# Patient Record
Sex: FEMALE | Race: ASIAN | NOT HISPANIC OR LATINO | Employment: FULL TIME | URBAN - METROPOLITAN AREA
[De-identification: names, ages, dates, MRNs, and addresses within clinical notes are randomized per-mention and may not be internally consistent; named-entity substitution may affect disease eponyms.]

---

## 2016-07-22 NOTE — PROGRESS NOTES
Assessment   1  Vaginal pain (625 9) (R10 2)   2  Vaginal discharge (623 5) (N89 8)   3  Fever (780 60) (R50 9)   4  Herpes simplex infection (054 9) (B00 9)   5  Unprotected sex (V69 2) (Z72 51)    Plan   Fever, Herpes simplex infection, SocHx: Unprotected sex, Vaginal discharge, Vaginal  pain    · (1) CHLAMYDIA/GC AMPLIFIED DNA, PCR; Source:Urine, Unspecified Source; Status: In  Progress - Specimen/Data Collected;   Done: 27FQQ5320   · (1) HERPES SIMPLEX VIRUS CULTURE; Status: In Progress - Specimen/Data Collected;    Done: 90GKT6332  Herpes simplex infection    · ValACYclovir HCl - 1 GM Oral Tablet; take 1 tablet PO Q12 hours x 10 days  Herpes simplex infection, Vaginal discharge, Vaginal pain    · TraMADol HCl - 50 MG Oral Tablet; TAKE 1 TABLET TWICE DAILY AS NEEDED    (1) VAGINITIS/ VAGINOSIS, DNA ( AFFIRM); Status:Resulted - Requires Verification;   Done: 05CVE6890 12:00AM  Due:18Dkj4580;Ordered; For:Fever, Herpes simplex infection, SocHx: Unprotected sex, Vaginal discharge, Vaginal pain; Ordered By:Vanda Pond; Discussion/Summary    Patient is a 53-year-old female who presents today for vaginal discharge, vaginal pain for the past 3-4 days as well as fever and nausea  She had called while I was on-call last night around 7 PM stating that she had vaginal symptoms and called her GYN yesterday who prescribed her Diflucan  She states that that didn't help and she was on her way to an urgent care for further evaluation because her symptoms were unbearable  Patient told her that she should follow-up if she has any persistent symptoms  Today patient shows up stating that they put her on doxycycline by mouth, but gave her IV antibiotics for possible PID at the urgent care, as well as IV fluids for dehydration  Unfortunately we have no records from her urgent care visit as of yet and patient is unsure what else they tested for her or what else they did   She states she took her first dose of doxycycline last night and then tried to take it this morning on an empty stomach and vomited  She states they also gave her another medication and when she showed me the bottle it was metronidazole which I'm assuming to cover for bacterial vaginosis or Trichomonas  She stated they did not perform a vaginal exam her genital exam  She has a fairly significant abnormal genital exam with extensive sticky discharge from her vagina as well as few external ulcers and few ulcers internally as well that are very painful  I did perform a vaginal swab to rule out BV and Trichomonas  I also obtained a swab of the genital lesions to rule out herpes  She does have oral herpes on exam so I did put her on a course of Valtrex Ã10 days enough to cover for both infections  We also collected a urine specimen on her to send out for chlamydia and gonorrhea, which is of most high suspicion at this time  I told her that we need to have her take the doxycycline with food, as this is the medication was going to kill any chlamydia or gonorrhea STDs that she may have  I told patient if she continues to vomit up the medication I may need to consider switching and she needs to call immediately if this continues  Less likely is vaginosis her vaginitis, so I did tell her not to start the metronidazole as of yet until the testing comes back  I did prescribe her tramadol was given a written prescription from the office today to help with her discomfort  She should continue Motrin or Tylenol for fever control  I also told her that she should consider making a follow-up appointment in one week with her gynecologist if the symptoms continue and we cannot find answers to the cause of her symptoms from the testing  I told her we can call sooner to get her in if necessary  Work note given for today, tomorrow, Wednesday  She should call sooner with any high fevers or worse symptoms  We will try to obtain the urgent care record for our records     Possible side effects of new medications were reviewed with the patient/guardian today  The treatment plan was reviewed with the patient/guardian  The patient/guardian understands and agrees with the treatment plan      Chief Complaint  PT complains of burning and vaginal discharge since Saturday  states she had some vaginal bleeding also      History of Present Illness  HPI: Patient is a 25y/o female here for close f/u to vaginal sxs  She was evaluated by [facility] for sxs  States had blood drawn  Was given IV abx and fluids for high fever, possible PID and dehydration  Started on doxycycline for possible infection  No OV note on file  She was given diflucan PO by her GYN the day prior because she called GYN on call and that is what they prescribed for her vaginal sxs  Pt currently admits to nausea, pain of external genitals and vagina, and lips  Also throat is sore  She has a low grade temp  Both urinary frequency and urgency  Burning of genitals when urine touches it  Currently took doxycycline  She vomited x 1 this AM after taking 2nd dose  Taking motrin  Admits to vaginal discharge, runny, white, orange  She admits to having unprotected sex within the past week with a new partner  Sxs she is experiencing sxs for past 4 days  Review of Systems    Constitutional: as noted in HPI  Cardiovascular: no complaints of slow or fast heart rate, no chest pain, no palpitations, no leg claudication or lower extremity edema  Respiratory: no complaints of shortness of breath, no wheezing, no dyspnea on exertion, no orthopnea or PND  Gastrointestinal: as noted in HPI  Genitourinary: as noted in HPI  Active Problems   1  Acute bronchitis with bronchospasm (466 0) (J20 9)   2  Acute upper respiratory infection (465 9) (J06 9)   3  Depression with anxiety (300 4) (F41 8)   4  Low back pain (724 2) (M54 5)   5  Lyme disease (088 81) (A69 20)   6  Multiple joint pain (719 49) (M25 50)   7   Open wound of finger, initial encounter (883  0) (S61 209A)   8  Sciatica of left side (724 3) (M54 32)    Past Medical History   1  History of Acute sinusitis (461 9) (J01 90)   2  History of Atypical chest pain (786 59) (R07 89)   3  History of Contact dermatitis (692 9) (L25 9)   4  History of Dysuria (788 1) (R30 0)   5  History of Fatigue (780 79) (R53 83)   6  History of heartburn (V12 79) (Z87 898)   7  History of Microscopic hematuria (599 72) (R31 2)   8  History of Need for influenza vaccination (V04 81) (Z23)   9  History of Palpitations (785 1) (R00 2)   10  History of Urinary tract infection (599 0) (N39 0)    Family History  Mother    1  Family history of Arthritis   2  Family history of Hypertension    Social History   · Former smoker (X90 14) (U17 509)   · Unprotected sex (V69 2) (Z72 51)   · Uses birth control (V25 9) (Z30 9)  The social history was reviewed and updated today  Current Meds   1  Cephalexin 500 MG Oral Capsule; TAKE 1 CAPSULE Every twelve hours; Therapy: 69LMQ4028 to (Evaluate:15Mar2016)  Requested for: 84EKA0229; Last   Rx:10Mar2016 Ordered   2  ClonazePAM 0 5 MG Oral Tablet; TAKE 1 TABLET Bedtime PRN; Therapy: 91MNG3248 to (Evaluate:12Htg7332)  Requested for: 16LMF2241; Last   Rx:18Jun2015 Ordered   3  Depo-Provera 150 MG/ML Intramuscular Suspension; inject intramuscularly every 12   weeks as directed; Therapy: 10CBE7992 to Recorded   4  Doxycycline Hyclate 100 MG Oral Capsule; Take 1 capsule twice daily; Therapy: 76IGJ5678 to (Evaluate:66Vqb6706) Recorded   5  Motrin  MG Oral Tablet; Therapy: 11QSY1065 to Recorded    The medication list was reviewed and updated today  Allergies   1   BL Petroleum Jelly OINT    Vitals   Recorded: 01AHC3865 38:30WG   Systolic 101   Diastolic 60   Heart Rate 79   Respiration 17   Temperature 100 4 F, Tympanic   Pain Scale 8   O2 Saturation 98   Height 5 ft 3 in   Weight 121 lb 7 04 oz   BMI Calculated 21 51   BSA Calculated 1 57     Physical Exam    Constitutional General appearance: No acute distress, well appearing and well nourished  appears healthy, comfortable, within normal limits of ideal weight and appearance reflects stated age  Vitals reviewed  Pulmonary   Respiratory effort: No increased work of breathing or signs of respiratory distress  Cardiovascular   Auscultation of heart: Normal rate and rhythm, normal S1 and S2, no murmurs  Abdomen   Abdomen: Non-tender, no masses  Genitourinary   External genitalia and vagina: Abnormal   4 small ulcers/bumps appearing on external genitals, 2 distal right labia majora, 2 st tip where labia meet before perinium  There is clear, shiny but extremely sticky discharge noted at vaginal opening and on extrenla genitals  Upon entry into vaginal opening, revealing few small white ulceritic lesions, very tender to touch  appears very red and swollen at opening  Unfortunately, I am unable to advance speculum any more secondary to pain  Skin   Skin and subcutaneous tissue: Abnormal   multiple erythematous clusters of vesicles on lips, consistent with herpes simplex infection  Psychiatric   Judgment and insight: Normal     Orientation to person, place, and time: Normal     Mood and affect: Abnormal   Mood and Affect: concerned and tearful        Signatures   Electronically signed by : Brooklynn Delatorre, [facility]; Jul 18 2016 12:29PM EST                       (Author)    Electronically signed by : Lin Woo DO; Jul 21 2016  5:26AM EST                       (Co-author)

## 2017-04-12 ENCOUNTER — ALLSCRIPTS OFFICE VISIT (OUTPATIENT)
Dept: OTHER | Facility: OTHER | Age: 27
End: 2017-04-12

## 2017-08-23 ENCOUNTER — ALLSCRIPTS OFFICE VISIT (OUTPATIENT)
Dept: OTHER | Facility: OTHER | Age: 27
End: 2017-08-23

## 2017-12-23 ENCOUNTER — APPOINTMENT (OUTPATIENT)
Dept: LAB | Facility: HOSPITAL | Age: 27
End: 2017-12-23
Payer: COMMERCIAL

## 2017-12-23 ENCOUNTER — OFFICE VISIT (OUTPATIENT)
Dept: URGENT CARE | Facility: MEDICAL CENTER | Age: 27
End: 2017-12-23
Payer: COMMERCIAL

## 2017-12-23 DIAGNOSIS — J02.9 ACUTE PHARYNGITIS: ICD-10-CM

## 2017-12-23 LAB — S PYO AG THROAT QL: NEGATIVE

## 2017-12-23 PROCEDURE — 87070 CULTURE OTHR SPECIMN AEROBIC: CPT

## 2017-12-23 PROCEDURE — 87430 STREP A AG IA: CPT

## 2017-12-23 PROCEDURE — 99203 OFFICE O/P NEW LOW 30 MIN: CPT

## 2017-12-24 NOTE — PROGRESS NOTES
Assessment   1  History of gastroenteritis (V12 79) (Z87 19)1   2  Gastroenteritis (558 9) (K52 9)1      1 Amended By: Elvira Teresa; Dec 28 2017 9:08 PM EST      Plan   Gastroenteritis    · Start: Ondansetron 4 MG Oral Tablet Disintegrating (Zofran ODT); 1 tablet by mouth every    8 hours when necessary nausea    Discussion/Summary   Discussion Summary:    Rapid strep test-negative  I prescribed Zofran 4 mg ODT q 8 hours for nausea and dizziness  Advised patient to maintain a clear liquid diet as tolerated and advance as tolerated  Continue with ibuprofen for body aches  Suggested over-the-counter Chloraseptic or Cepacol lozenges  Medication Side Effects Reviewed: Possible side effects of new medications were reviewed with the patient/guardian today  Understands and agrees with treatment plan: The treatment plan was reviewed with the patient/guardian  The patient/guardian understands and agrees with the treatment plan    Counseling Documentation With Imm: The patient was counseled regarding diagnostic results  Chief Complaint   1  Sore Throat  Chief Complaint Free Text Note Form: Pt states has dizziness since yesterday, vomited once this am  State son was diagnosed with strep 3 days ago and has sore throat  States  I think I may be getting strep  Took Ibuprofen this am       History of Present Illness   HPI: Patient with 1 day history of dizziness  She still complaining of dizziness  Symptoms also accompanied by nausea, vomiting and diarrhea which began today  She is also complaining of sore throat since yesterday  Also complaining of accompanying body aches  Describes having some slight tinge of blood in diarrhea this morning  Expresses concern because son was recently diagnosed with strep  Hospital Based Practices Required Assessment:      Pain Assessment      the patient states they have pain  The pain is located in the throat   The patient describes the pain as aching  (on a scale of 0 to 10, the patient rates the pain at 5 )       Prefered Language is  english  Primary Language is  english  Review of Systems   Focused-Female:      Constitutional: No fever, no chills, feels well, no tiredness, no recent weight gain or loss  ENT: sore throat  Cardiovascular: no complaints of slow or fast heart rate, no chest pain, no palpitations, no leg claudication or lower extremity edema  Respiratory: no complaints of shortness of breath, no wheezing, no dyspnea on exertion, no orthopnea or PND  Gastrointestinal: nausea,-- vomiting-- and-- diarrhea  Active Problems   1  Alcohol dependence, episodic drinking behavior (303 92) (F10 20)  2  Depression with anxiety (300 4) (F41 8)  3  Flu vaccine need (V04 81) (Z23)  4  Skin tear of female genital tract, initial encounter (878 8) (S31 512A)    Past Medical History   1  History of Acute sinusitis (461 9) (J01 90)  2  History of Acute upper respiratory infection (465 9) (J06 9)  3  History of Atypical chest pain (786 59) (R07 89)  4  History of Contact dermatitis (692 9) (L25 9)  5  History of Dysuria (788 1) (R30 0)  6  History of Fatigue (780 79) (R53 83)  7  History of acute bronchitis with bronchospasm (V12 69) (Z87 09)  8  History of fever (V13 89) (Z87 898)  9  History of heartburn (V12 79) (Z87 898)  10  History of herpes simplex infection (V12 09) (Z86 19)  11  History of vaginal discharge (V13 29) (Z87 42)  12  History of Microscopic hematuria (599 72) (R31 29)  13  History of Need for influenza vaccination (V04 81) (Z23)  14  History of Open wound of finger, initial encounter (883 0) (S61 209A)  15  History of Palpitations (785 1) (R00 2)  16  History of Rash of genital area (782 1) (R21)  17  History of Sciatica of left side (724 3) (M54 32)  18  History of Urinary tract infection (599 0) (N39 0)  19  History of Vaginal pain (625 9) (R10 2)  Active Problems And Past Medical History Reviewed:     The active problems and past medical history were reviewed and updated today  Family History   Mother   1  Family history of Arthritis  2  Family history of Hypertension    Social History    · Former smoker (Q67 69) (Q19 369)   · Uses birth control (V25 9) (Z30 9)    Current Meds   1  ClonazePAM 0 5 MG Oral Tablet; TAKE 1 TABLET AT BEDTIME AS NEEDED FOR SLEEP; Therapy: 73KSY0825 to (Evaluate:35Aiq4279)  Requested for: 12Apr2017; Last     Rx:12Apr2017 Ordered  2  Depo-Provera 150 MG/ML Intramuscular Suspension; inject intramuscularly every 12     weeks as directed; Therapy: 88NVD3599 to Recorded  3  Motrin  MG Oral Tablet; Therapy: 38WNW2097 to Recorded  4  Nystatin-Triamcinolone 987250-3 1 UNIT/GM-% External Ointment; APPLY SPARINGLY     TO AFFECTED AREA(S) TWICE DAILY; Therapy: 25Jft1630 to ( [place])  Requested for: 53Isz9406 Ordered  Medication List Reviewed: The medication list was reviewed and updated today  Allergies   1  BL Petroleum Jelly OINT    Vitals   Signs   Recorded: 86Vga1886 08:17AM   Temperature: 98 3 F  Heart Rate: 70  Respiration: 20  Systolic: 299  Diastolic: 70  Height: 5 ft 2 in  Weight: 136 lb   BMI Calculated: 24 88  BSA Calculated: 1 62  O2 Saturation: 100  Pain Scale: 5    Physical Exam        Constitutional      General appearance: No acute distress, well appearing and well nourished  Ears, Nose, Mouth, and Throat      External inspection of ears and nose: Normal        Otoscopic examination: Tympanic membranes translucent with normal light reflex  Canals patent without erythema  Nasal mucosa, septum, and turbinates: Normal without edema or erythema  Oropharynx: Normal with no erythema, edema, exudate or lesions  Pulmonary      Respiratory effort: No increased work of breathing or signs of respiratory distress  Auscultation of lungs: Clear to auscultation         Cardiovascular      Auscultation of heart: Normal rate and rhythm, normal S1 and S2, without murmurs  Abdomen      Abdomen: Non-tender, no masses  Liver and spleen: No hepatomegaly or splenomegaly         Results/Data   Lab Studies Reviewed: Rapid strep test-negative      Signatures    Electronically signed by : CHLOÉ De Leon ; Dec 23 2017  8:36AM EST                       (Author)     Electronically signed by : CHLOÉ De Leon ; Dec 28 2017  9:09PM EST                       (Author)

## 2017-12-25 LAB — BACTERIA THROAT CULT: NORMAL

## 2018-01-10 NOTE — RESULT NOTES
Verified Results  (1) HERPES SIMPLEX VIRUS CULTURE 67DBB3628 12:29PM Renata Eldridge    Order Number: [id number]     Test Name Result Flag Reference   HERPES SIMPLEX VIRUS CULTURE  A Negative-No Herpes Simplex Virus isolated , Toxic, Contaminated   Positive-Herpes Simplex Virus isolated

## 2018-01-13 VITALS
DIASTOLIC BLOOD PRESSURE: 84 MMHG | HEART RATE: 65 BPM | TEMPERATURE: 99.4 F | RESPIRATION RATE: 16 BRPM | OXYGEN SATURATION: 98 % | SYSTOLIC BLOOD PRESSURE: 122 MMHG | HEIGHT: 62 IN | BODY MASS INDEX: 24.15 KG/M2 | WEIGHT: 131.25 LBS

## 2018-01-14 VITALS
BODY MASS INDEX: 23.29 KG/M2 | WEIGHT: 126.56 LBS | SYSTOLIC BLOOD PRESSURE: 124 MMHG | TEMPERATURE: 99.2 F | DIASTOLIC BLOOD PRESSURE: 80 MMHG | RESPIRATION RATE: 16 BRPM | OXYGEN SATURATION: 99 % | HEIGHT: 62 IN | HEART RATE: 93 BPM

## 2018-01-15 NOTE — MISCELLANEOUS
Message  Return to work or school:   Charly Zavaleta is under my professional care  She was seen in my office on 7/18/16   She is able to return to work on  07/21/2016      OUt of work 07/18/16 - 07/20/2016  Pauline Rodas PA-C        Signatures   Electronically signed by : Claritza Bravo; Jul 18 2016 12:15PM EST                       (Author)    Electronically signed by : Pauline Rodas, [facility]; Jul 20 2016 11:29AM EST                       (Author)

## 2018-01-15 NOTE — MISCELLANEOUS
Message  Return to work or school:   Severiano Do is under my professional care  She was seen in my office on 7/18/16   She is able to return to work on  7/21/16       Robe Morrison- PAC        Signatures   Electronically signed by : Leda James MA; Jul 18 2016  1:28PM EST                       (Author)

## 2018-01-16 NOTE — RESULT NOTES
Verified Results  (1) VAGINITIS/ VAGINOSIS, DNA ( AFFIRM) 09RYN3018 12:30PM Cecile Dow Order Number: [id number]     Test Name Result Flag Reference   CANDIDA SPECIES Negative  Negative   GARDNERELLA VAGINALIS Negative  Negative   TRICHOMONAS VAGINALIS Negative  Negative   Performed at:  [employer] [address]  [id number]  : Adonay Mayberry MD, Phone:  [phone]

## 2018-01-17 NOTE — MISCELLANEOUS
Message  Message Free Text Note Form: Pt called while I was on call last night around 7PM  STates called her GYN yesterday for possible yeast infection  Took diflucan, no better  No has cold sores and also some vaginal sxs  When I called her back about 15 min after she called, she states she already on her way to urgent care because sxs are "unbearable "      Discussion/Summary  Discussion Summary:   Pt going to urgent care for further eval because sxs are "unbearable " She states she will make appt at our office if sxs continue and needs further evaluation and tx        Signatures   Electronically signed by : Catrachito Paz, [facility]; Jul 18 2016 10:54AM EST                       (Author)

## 2018-01-19 ENCOUNTER — OFFICE VISIT (OUTPATIENT)
Dept: URGENT CARE | Facility: CLINIC | Age: 28
End: 2018-01-19
Payer: COMMERCIAL

## 2018-01-19 PROCEDURE — 99213 OFFICE O/P EST LOW 20 MIN: CPT

## 2018-01-23 VITALS
WEIGHT: 136 LBS | RESPIRATION RATE: 20 BRPM | TEMPERATURE: 98.3 F | HEIGHT: 62 IN | SYSTOLIC BLOOD PRESSURE: 116 MMHG | HEART RATE: 70 BPM | DIASTOLIC BLOOD PRESSURE: 70 MMHG | OXYGEN SATURATION: 100 % | BODY MASS INDEX: 25.03 KG/M2

## 2018-01-24 VITALS
HEART RATE: 62 BPM | HEIGHT: 62 IN | TEMPERATURE: 98.7 F | SYSTOLIC BLOOD PRESSURE: 145 MMHG | OXYGEN SATURATION: 97 % | BODY MASS INDEX: 25.21 KG/M2 | DIASTOLIC BLOOD PRESSURE: 87 MMHG | WEIGHT: 137 LBS | RESPIRATION RATE: 18 BRPM

## 2018-01-24 NOTE — PROGRESS NOTES
Assessment    1  Acute sinus infection (461 9) (J01 90)    Plan  Acute sinus infection    · Azithromycin 250 MG Oral Tablet; TAKE 2 TABLETS ON DAY 1 THEN TAKE 1  TABLET A DAY FOR 4 DAYS    Discussion/Summary  Discussion Summary:   Using flonase   use zyrtec or allegra   mucinex plain   increase fluids   anitbiotic as directed  Medication Side Effects Reviewed: Possible side effects of new medications were reviewed with the patient/guardian today  Understands and agrees with treatment plan: The treatment plan was reviewed with the patient/guardian  The patient/guardian understands and agrees with the treatment plan   Counseling Documentation With Imm: The patient was counseled regarding instructions for management, patient and family education, importance of compliance with treatment  Follow Up Instructions: Follow Up with your Primary Care Provider in 1-2 days  If your symptoms worsen, go to the nearest [place] [address] Emergency Department  Chief Complaint    1  Cough  Chief Complaint Free Text Note Form: pt reports cough since xmas, dx with flu on 12/27, cough never cleared up, congestion, sinus pain and HA; mucinex and tylenol cold/flu      History of Present Illness  HPI: 23 yo female, cough for the past few weeks, sinus pressure and congestion, post nasal drip, and hasn't had a flu shot this season  Hospital Based Practices Required Assessment:   Abuse And Domestic Violence Screen    Yes, the patient is safe at home  The patient states no one is hurting them  Depression And Suicide Screen  No, the patient has not had thoughts of hurting themself  No, the patient has not felt depressed in the past 7 days  Prefered Language is  english  Primary Language is  english  Review of Systems  Focused-Female:   Constitutional: as noted in HPI    ENT: as noted in HPI     Cardiovascular: no complaints of slow or fast heart rate, no chest pain, no palpitations, no leg claudication or lower extremity edema  Respiratory: as noted in HPI  ROS Reviewed:   ROS reviewed  Active Problems    1  Alcohol dependence, episodic drinking behavior (303 92) (F10 20)   2  Depression with anxiety (300 4) (F41 8)   3  Flu vaccine need (V04 81) (Z23)   4  Gastroenteritis (558 9) (K52 9)   5  Skin tear of female genital tract, initial encounter (878 8) (S31 512A)   6  Sore throat (462) (J02 9)    Past Medical History    1  History of Acute sinusitis (461 9) (J01 90)   2  History of Acute upper respiratory infection (465 9) (J06 9)   3  History of Atypical chest pain (786 59) (R07 89)   4  History of Contact dermatitis (692 9) (L25 9)   5  History of Dysuria (788 1) (R30 0)   6  History of Fatigue (780 79) (R53 83)   7  History of acute bronchitis with bronchospasm (V12 69) (Z87 09)   8  History of fever (V13 89) (Z87 898)   9  History of gastroenteritis (V12 79) (Z87 19)   10  History of heartburn (V12 79) (Z87 898)   11  History of herpes simplex infection (V12 09) (Z86 19)   12  History of vaginal discharge (V13 29) (Z87 42)   13  History of Microscopic hematuria (599 72) (R31 29)   14  History of Need for influenza vaccination (V04 81) (Z23)   15  History of Open wound of finger, initial encounter (883 0) (S61 209A)   16  History of Palpitations (785 1) (R00 2)   17  History of Rash of genital area (782 1) (R21)   18  History of Sciatica of left side (724 3) (M54 32)   19  History of Urinary tract infection (599 0) (N39 0)   20  History of Vaginal pain (625 9) (R10 2)  Active Problems And Past Medical History Reviewed: The active problems and past medical history were reviewed and updated today  Family History  Mother    1  Family history of Arthritis   2  Family history of Hypertension  Family History Reviewed: The family history was reviewed and updated today  Social History    · Former smoker (K43 67) (R93 863)   · Uses birth control (V25 9) (Z30 9)  Social History Reviewed:  The social history was reviewed and updated today  The social history was reviewed and is unchanged  Current Meds   1  ClonazePAM 0 5 MG Oral Tablet; TAKE 1 TABLET AT BEDTIME AS NEEDED FOR SLEEP; Therapy: 86UMR4810 to (Evaluate:32Jkl6268)  Requested for: 12Apr2017; Last   Rx:12Apr2017 Ordered   2  Depo-Provera 150 MG/ML Intramuscular Suspension; inject intramuscularly every 12   weeks as directed; Therapy: 09GNQ5829 to Recorded   3  Motrin  MG Oral Tablet; Therapy: 18PAR9301 to Recorded   4  Nystatin-Triamcinolone 878387-9 1 UNIT/GM-% External Ointment; APPLY SPARINGLY   TO AFFECTED AREA(S) TWICE DAILY; Therapy: 53Juy1172 to (Last Adán Martinez)  Requested for: 40Uyt4437 Ordered   5  Ondansetron 4 MG Oral Tablet Disintegrating; 1 tablet by mouth every 8 hours when   necessary nausea; Therapy: 88Wpv6275 to (Evaluate:58Vdb4600)  Requested for: 81Zkl2330; Last   Rx:10Lmr6936 Ordered  Medication List Reviewed: The medication list was reviewed and updated today  Allergies    1  BL Petroleum Jelly OINT    Vitals  Signs   Recorded: 52BIU5873 07:11PM   Temperature: 98 7 F  Heart Rate: 62  Respiration: 18  Systolic: 031  Diastolic: 87  Height: 5 ft 2 in  Weight: 137 lb   BMI Calculated: 25 06  BSA Calculated: 1 63  O2 Saturation: 97    Physical Exam    Constitutional   General appearance: No acute distress, well appearing and well nourished  Ears, Nose, Mouth, and Throat   External inspection of ears and nose: Normal     Otoscopic examination: Tympanic membranes translucent with normal light reflex  Canals patent without erythema  Nasal mucosa, septum, and turbinates: Abnormal   thick green mucus in both nares, red and inflammed turbinates noted  Oropharynx: Normal with no erythema, edema, exudate or lesions  Pulmonary   Respiratory effort: No increased work of breathing or signs of respiratory distress  intermittent dry cough noted  Auscultation of lungs: Clear to auscultation      Cardiovascular Auscultation of heart: Normal rate and rhythm, normal S1 and S2, without murmurs  Psychiatric   Orientation to person, place, and time: Normal     Mood and affect: Normal        Signatures   Electronically signed by :  VIOLETA Moses; Jan 19 2018  7:47PM EST                       (Author)

## 2018-07-26 ENCOUNTER — OFFICE VISIT (OUTPATIENT)
Dept: FAMILY MEDICINE CLINIC | Facility: CLINIC | Age: 28
End: 2018-07-26
Payer: COMMERCIAL

## 2018-07-26 VITALS
SYSTOLIC BLOOD PRESSURE: 122 MMHG | OXYGEN SATURATION: 97 % | HEART RATE: 74 BPM | TEMPERATURE: 99.5 F | RESPIRATION RATE: 15 BRPM | HEIGHT: 62 IN | WEIGHT: 124 LBS | DIASTOLIC BLOOD PRESSURE: 74 MMHG | BODY MASS INDEX: 22.82 KG/M2

## 2018-07-26 DIAGNOSIS — Z11.1 SCREENING-PULMONARY TB: ICD-10-CM

## 2018-07-26 DIAGNOSIS — Z00.00 ENCOUNTER FOR WELLNESS EXAMINATION IN ADULT: Primary | ICD-10-CM

## 2018-07-26 PROBLEM — Z30.42 ON DEPO-PROVERA FOR CONTRACEPTION: Status: ACTIVE | Noted: 2017-10-06

## 2018-07-26 PROBLEM — J45.909 ASTHMA: Status: ACTIVE | Noted: 2018-06-01

## 2018-07-26 PROBLEM — F32.A MOOD DISORDER OF DEPRESSED TYPE: Status: ACTIVE | Noted: 2018-06-01

## 2018-07-26 PROBLEM — F10.20 ALCOHOL DEPENDENCE, EPISODIC DRINKING BEHAVIOR (HCC): Status: ACTIVE | Noted: 2017-04-12

## 2018-07-26 PROCEDURE — 99395 PREV VISIT EST AGE 18-39: CPT | Performed by: PHYSICIAN ASSISTANT

## 2018-07-26 PROCEDURE — 86580 TB INTRADERMAL TEST: CPT | Performed by: PHYSICIAN ASSISTANT

## 2018-07-26 RX ORDER — MEDROXYPROGESTERONE ACETATE 150 MG/ML
150 INJECTION, SUSPENSION INTRAMUSCULAR
COMMUNITY
Start: 2014-11-03

## 2018-07-26 RX ORDER — HYDROXYZINE HYDROCHLORIDE 25 MG/1
1-2 TABLET, FILM COATED ORAL EVERY 6 HOURS PRN
COMMUNITY
Start: 2018-06-11 | End: 2018-11-01

## 2018-07-26 RX ORDER — NYSTATIN AND TRIAMCINOLONE ACETONIDE 100000; 1 [USP'U]/G; MG/G
1 OINTMENT TOPICAL AS NEEDED
COMMUNITY
Start: 2017-08-23 | End: 2019-11-17 | Stop reason: SDUPTHER

## 2018-07-26 RX ORDER — CITALOPRAM 40 MG/1
1 TABLET ORAL DAILY
COMMUNITY
Start: 2018-07-14 | End: 2018-11-29 | Stop reason: SDUPTHER

## 2018-07-26 RX ORDER — SILVER SULFADIAZINE 1 %
1 CREAM (GRAM) TOPICAL AS NEEDED
COMMUNITY
Start: 2018-05-30

## 2018-07-26 RX ORDER — MEDROXYPROGESTERONE ACETATE 150 MG/ML
150 INJECTION, SUSPENSION INTRAMUSCULAR
COMMUNITY
Start: 2018-05-01 | End: 2018-11-01

## 2018-07-26 RX ORDER — TRAZODONE HYDROCHLORIDE 50 MG/1
1 TABLET ORAL
COMMUNITY
Start: 2018-06-11 | End: 2019-01-02 | Stop reason: ALTCHOICE

## 2018-07-26 NOTE — PROGRESS NOTES
Assessment/Plan:         Diagnoses and all orders for this visit:    Encounter for wellness examination in adult    Screening-pulmonary TB  -     TB Skin Test    Other orders  -     citalopram (CeleXA) 40 mg tablet; Take 1 mg by mouth daily  -     medroxyPROGESTERone (DEPO-PROVERA) 150 mg/mL injection; Inject 150 mL into a muscle every 4 (four) months  -     hydrOXYzine HCL (ATARAX) 25 mg tablet; Take 1-2 tablets by mouth every 6 (six) hours as needed  -     nystatin-triamcinolone (MYCOLOG-II) ointment; Apply 1 application topically 2 (two) times a day  -     SSD 1 % cream; Apply 1 application topically as needed  -     traZODone (DESYREL) 50 mg tablet; Take 1 mg by mouth daily at bedtime as needed  -     medroxyPROGESTERone acetate (DEPO-PROVERA SYRINGE) 150 mg/mL injection; Inject 150 mg into a muscle      Pt doing very well overall from a physical standpoint  Forms filled out  Cleared for school pending completion of 2 step PPD  RTO 1 yr, sooner PRN  Chief Complaint   Patient presents with    Physical Exam     Pt presents for an annual PE with a PPD administration needed for her health physical from work  Pt does have forms that requires a providers signature  Other smith Pt has no concerns at this time  Subjective:      Patient ID: Cheli Acosta is a 34 y o  female  Pt presents for annual PE/wellness visit and PPD  Feeling well, without complaints   Needs forms filled out for college PE        Cheli Acosta presents for health maintenance visit   34 y o   female    He/she states that  level of health is:  good     Dental issues :no; does not attend regular dental visits    Vision issues: yes; glasses    Hearing issues: no    Up-to-date on immunizations: yes    Diet: good     Exercise:  three times a week    Tobacco: yes; 3-5 cigarettes/day    ETOH: no; pt quit drinking    Illegal drugs: no    UTD with Pap/pelvic exam     The following portions of the patient's history were reviewed and updated as appropriate:   She  has a past medical history of Herpes simplex infection; Palpitations; and Sciatica  She   Patient Active Problem List    Diagnosis Date Noted    Asthma 06/01/2018    Mood disorder of depressed type 06/01/2018    On Depo-Provera for contraception 10/06/2017    Alcohol dependence, episodic drinking behavior ([facility] [address] ) 04/12/2017    Depression with anxiety 11/03/2014     She  has no past surgical history on file  Her family history includes Arthritis in her mother; Hypertension in her mother  She  reports that she has been smoking Cigarettes  She has never used smokeless tobacco  She reports that she does not drink alcohol or use drugs  Current Outpatient Prescriptions   Medication Sig Dispense Refill    citalopram (CeleXA) 40 mg tablet Take 1 mg by mouth daily      hydrOXYzine HCL (ATARAX) 25 mg tablet Take 1-2 tablets by mouth every 6 (six) hours as needed      medroxyPROGESTERone (DEPO-PROVERA) 150 mg/mL injection Inject 150 mL into a muscle every 4 (four) months      medroxyPROGESTERone acetate (DEPO-PROVERA SYRINGE) 150 mg/mL injection Inject 150 mg into a muscle      nystatin-triamcinolone (MYCOLOG-II) ointment Apply 1 application topically 2 (two) times a day      SSD 1 % cream Apply 1 application topically as needed      traZODone (DESYREL) 50 mg tablet Take 1 mg by mouth daily at bedtime as needed       No current facility-administered medications for this visit  No current outpatient prescriptions on file prior to visit  No current facility-administered medications on file prior to visit  She is allergic to lactose; petrolatum; and white petrolatum       Review of Systems   Constitutional: Negative  HENT: Negative  Eyes: Negative  Respiratory: Negative  Cardiovascular: Negative  Gastrointestinal: Negative  Endocrine: Negative  Genitourinary: Negative  Musculoskeletal: Negative  Skin: Negative  Allergic/Immunologic: Negative  Neurological: Negative  Hematological: Negative  Psychiatric/Behavioral: Negative  Objective:      /74 (BP Location: Left arm, Patient Position: Sitting, Cuff Size: Adult)   Pulse 74   Temp 99 5 °F (37 5 °C) (Tympanic)   Resp 15   Ht 5' 2" (1 575 m)   Wt 56 2 kg (124 lb)   LMP  (Within Years)   SpO2 97%   BMI 22 68 kg/m²          Physical Exam   Constitutional: She is oriented to person, place, and time  She appears well-developed and well-nourished  No distress  HENT:   Head: Normocephalic and atraumatic  Right Ear: External ear normal    Left Ear: External ear normal    Nose: Nose normal    Mouth/Throat: Oropharynx is clear and moist    Eyes: Conjunctivae and EOM are normal  Pupils are equal, round, and reactive to light  Neck: Normal range of motion  Neck supple  No thyromegaly present  Cardiovascular: Normal rate, regular rhythm and normal heart sounds  Pulmonary/Chest: Effort normal and breath sounds normal  She has no wheezes  She has no rales  Abdominal: Soft  Bowel sounds are normal  She exhibits no distension  There is no tenderness  Musculoskeletal: Normal range of motion  She exhibits no edema  Lymphadenopathy:     She has no cervical adenopathy  Neurological: She is alert and oriented to person, place, and time  She has normal reflexes  No cranial nerve deficit  Skin: Skin is warm and dry  Psychiatric: She has a normal mood and affect  Vitals reviewed

## 2018-07-29 LAB
INDURATION: 0 MM
TB SKIN TEST: NEGATIVE

## 2018-08-06 ENCOUNTER — CLINICAL SUPPORT (OUTPATIENT)
Dept: FAMILY MEDICINE CLINIC | Facility: CLINIC | Age: 28
End: 2018-08-06
Payer: COMMERCIAL

## 2018-08-06 DIAGNOSIS — Z11.1 SCREENING FOR TUBERCULOSIS: Primary | ICD-10-CM

## 2018-08-06 PROCEDURE — 86580 TB INTRADERMAL TEST: CPT | Performed by: FAMILY MEDICINE

## 2018-08-08 LAB
INDURATION: 0 MM
TB SKIN TEST: NEGATIVE

## 2018-08-09 ENCOUNTER — CLINICAL SUPPORT (OUTPATIENT)
Dept: FAMILY MEDICINE CLINIC | Facility: CLINIC | Age: 28
End: 2018-08-09
Payer: COMMERCIAL

## 2018-08-09 DIAGNOSIS — Z11.59 NEED FOR HEPATITIS B SCREENING TEST: ICD-10-CM

## 2018-08-09 DIAGNOSIS — Z01.84 IMMUNITY STATUS TESTING: Primary | ICD-10-CM

## 2018-08-09 DIAGNOSIS — Z11.59 NEED FOR HEPATITIS C SCREENING TEST: ICD-10-CM

## 2018-08-09 PROCEDURE — 36415 COLL VENOUS BLD VENIPUNCTURE: CPT | Performed by: FAMILY MEDICINE

## 2018-08-10 LAB
HBV SURFACE AB SER-ACNC: 6.3 MIU/ML
HBV SURFACE AG SERPL QL IA: NEGATIVE
HCV AB S/CO SERPL IA: 0.1 S/CO RATIO (ref 0–0.9)
MEV IGG SER IA-ACNC: <25 AU/ML
MUV IGG SER IA-ACNC: 75.9 AU/ML
RUBV IGG SERPL IA-ACNC: <0.9 INDEX
VZV IGG SER IA-ACNC: 2816 INDEX

## 2018-11-01 ENCOUNTER — OFFICE VISIT (OUTPATIENT)
Dept: FAMILY MEDICINE CLINIC | Facility: CLINIC | Age: 28
End: 2018-11-01
Payer: COMMERCIAL

## 2018-11-01 VITALS
BODY MASS INDEX: 22.15 KG/M2 | DIASTOLIC BLOOD PRESSURE: 70 MMHG | WEIGHT: 125 LBS | HEIGHT: 63 IN | RESPIRATION RATE: 16 BRPM | SYSTOLIC BLOOD PRESSURE: 110 MMHG | TEMPERATURE: 98.3 F | OXYGEN SATURATION: 98 % | HEART RATE: 55 BPM

## 2018-11-01 DIAGNOSIS — Z86.59 HISTORY OF SUICIDAL IDEATION: ICD-10-CM

## 2018-11-01 DIAGNOSIS — F41.8 DEPRESSION WITH ANXIETY: Primary | ICD-10-CM

## 2018-11-01 DIAGNOSIS — F50.9 EATING DISORDER: ICD-10-CM

## 2018-11-01 DIAGNOSIS — F10.20 ALCOHOL DEPENDENCE, EPISODIC DRINKING BEHAVIOR (HCC): ICD-10-CM

## 2018-11-01 PROCEDURE — 3008F BODY MASS INDEX DOCD: CPT | Performed by: PHYSICIAN ASSISTANT

## 2018-11-01 PROCEDURE — 99214 OFFICE O/P EST MOD 30 MIN: CPT | Performed by: PHYSICIAN ASSISTANT

## 2018-11-01 NOTE — PROGRESS NOTES
Assessment/Plan:      Diagnoses and all orders for this visit:    Depression with anxiety  -     Ambulatory referral to behavioral health therapists; Future    Alcohol dependence, episodic drinking behavior ([facility] [address] )  -     Ambulatory referral to behavioral health therapists; Future    History of suicidal ideation  -     Ambulatory referral to behavioral health therapists; Future    Eating disorder  -     Ambulatory referral to behavioral health therapists; Future        Patient is a 26-year-old female presenting today for discussion of her mental health issues  Unfortunately earlier this year she had thoughts of harming herself and acted by cutting her wrists and was taken by a family member to [facility] and spent some time at the [facility] in patient  She then attended partial program and has since been seeing a therapist and psychiatrist through [facility]  She states lately though she has been overall doing much better, she has not had a great relationship with her psychiatrist and therapist and states that she actually had a conversation with them on the phone where they had yelled at her and felt this respected  She does not feel comfortable seeing them any longer  She states that they did not listen to her regarding her potential side effects to medications  She has been having bloating and constipation on the hydroxyzine in addition to feeling hung over in the morning after taking trazodone p r n  Insomnia  She states that she has been doing very well on Celexa and feel that overall it has helped her with her mood and her emotions  At this time I will have her discontinue the hydroxyzine and see if her  Symptoms resolve  She can continue trazodone p r n  But I will have her take half a tablet (25mg)  And see if she gets the same affect  She will be continued on Celexa 40 mg daily        I do believe patient should have continued the meetings with a therapist for the significance of her history  I will refer to our behavioral health specialist Unknown Lainey chandler for further evaluation and help in finding a new therapist   Depending how she does emotionally I may also consider having her refer to psychiatry but I will try my best to keep her medications managed at this time  Depending how she does regarding her eating disorder and her mood I may consider adding a mood stabilizer depending but she is doing well for now  She will follow up with me in about 4-6 weeks to, she is to call sooner with any concerns  She will make appointment with Cass chandler at her earliest convenience  Chief Complaint   Patient presents with    Nausea     x 1 day/headache/wants to stop Atarax/referal to therapist       Subjective:     Patient ID: Michael Mosley is a 34 y o  female  27y/o female here today for discussion of her depression and anxiety  She states she is constantly constipated and  Bloated from hydroxyzine but states her psychiatrist insists on keeping her on it  She is frustrated by the way she was treated  She was inpatient in May 2018 for self harm  -she cut her wrists  She also had issues with alcoholism  She was admitted to [facility] after eval at [facility]  Attended partial program [facility]  Medication management at [facility] where she saw therapist and psychiatrist who have been managing her medications and tx until this point  The celexa she likes and thinks it works well, but stopped it for a few days and starting to fell terrible  Didn't have time to get medication refilled  Hydroxyzine causing bloating and abdominal pain and wants to stop  Taking it BID  To replace the hydroxyzine she has been using CBD oil orally and Vit B complex  She takes trazodone prn insomnia - causes her to feel hung over and fatigued  She overall thinks she has been doing better but is under a lot of stress  She does have a hx of eating disorder - bulmia and anorexia, binge eating  She has not relapsed with eating d/o, but states she thinks about it because she can control it  She denies any suicidal thoughts  Regarding her ETOH, she has been 5 months sober  Review of Systems   Constitutional: Negative  Respiratory: Negative  Cardiovascular: Negative  Gastrointestinal:        As in HPI   Genitourinary: Negative  Neurological: Negative  Psychiatric/Behavioral:        As in HPI         The following portions of the patient's history were reviewed and updated as appropriate: allergies, current medications, past family history, past medical history, past social history, past surgical history and problem list       Objective:     Physical Exam   Constitutional: She is oriented to person, place, and time  She appears well-developed and well-nourished  Neck: Neck supple  Normal carotid pulses present  Carotid bruit is not present  Cardiovascular: Normal rate, regular rhythm, normal heart sounds and normal pulses  Pulmonary/Chest: Effort normal and breath sounds normal    Abdominal: Normal appearance and bowel sounds are normal  There is no tenderness  Lymphadenopathy:     She has no cervical adenopathy  Neurological: She is alert and oriented to person, place, and time  Psychiatric: Her speech is normal and behavior is normal  Judgment and thought content normal  Cognition and memory are normal  She exhibits a depressed mood (tearful, upset)  She expresses no homicidal and no suicidal ideation  She expresses no suicidal plans and no homicidal plans  Vitals reviewed        Vitals:    11/01/18 1556   BP: 110/70   BP Location: Left arm   Patient Position: Sitting   Cuff Size: Adult   Pulse: 55   Resp: 16   Temp: 98 3 °F (36 8 °C)   TempSrc: Tympanic   SpO2: 98%   Weight: 56 7 kg (125 lb)   Height: 5' 2 6" (1 59 m)

## 2018-11-16 ENCOUNTER — DOCUMENTATION (OUTPATIENT)
Dept: FAMILY MEDICINE CLINIC | Facility: CLINIC | Age: 28
End: 2018-11-16

## 2018-11-16 NOTE — PROGRESS NOTES
PT HAS A$0 COPAY, NO AUTH NEEDED UNLESS DR APPT ARE OVER 45 MINUTES,  UNLIMITED VISITS AND CLAIMS TO [facility],  [address] [address] [facility]   REFERENCE # [id number] 11/16/18

## 2018-11-23 ENCOUNTER — OFFICE VISIT (OUTPATIENT)
Dept: BEHAVIORAL/MENTAL HEALTH CLINIC | Facility: CLINIC | Age: 28
End: 2018-11-23
Payer: COMMERCIAL

## 2018-11-23 DIAGNOSIS — F33.41 RECURRENT MAJOR DEPRESSIVE DISORDER, IN PARTIAL REMISSION (HCC): ICD-10-CM

## 2018-11-23 DIAGNOSIS — F10.20 ALCOHOL DEPENDENCE, EPISODIC DRINKING BEHAVIOR (HCC): Primary | ICD-10-CM

## 2018-11-23 DIAGNOSIS — F50.9 EATING DISORDER: ICD-10-CM

## 2018-11-23 PROCEDURE — 3725F SCREEN DEPRESSION PERFORMED: CPT | Performed by: PSYCHIATRY & NEUROLOGY

## 2018-11-23 PROCEDURE — 90832 PSYTX W PT 30 MINUTES: CPT | Performed by: PSYCHIATRY & NEUROLOGY

## 2018-11-23 NOTE — PSYCH
Assessment/Plan:      Diagnoses and all orders for this visit:    Alcohol dependence, episodic drinking behavior ([facility] [address] )    Recurrent major depressive disorder, in [facility] )          Subjective:     Patient ID: Thuy Mancera is a 34 y o  female  HPI  Met with Gerson Duenas for session to help her re-establish care with a new therapist   She said that she had been seeing a therapist and psychiatrist at [facility] in [facility], but the psychiatrist was not listening to her and her therapist was constantly dismissive and ended up yelling at her, which was the "last straw " Gersonbin Duenas stated that she has had issues with alcohol abuse in the past but is six months sober today  She was going to [place] [id number] regularly, but states that she does not feel she needs that extra support any longer, but knows that she can go back if she needs to  She also has a history of anorexia and bulimia, but is now using CBD oil that helps ease her anxiety and stimulates her appetite in a gentle way where she is not bingeing  Overall, she feels she is doing well, but would like continued support to make sure that she stays on the right track  This LCSW provided several names of therapists in her insurance network in her home area, and advised that she can contact this worker for any additional help that she might need  She also shared that her son is going back and forth between her and her father's house, and their custody agreement just changed, where he is with her for two weeks and then his dad for two weeks  He has started hearing voices in his head that are somewhat disturbing, which is something that Gerson Henrique experienced as a child when she moved from [place] to [place] but never got treatment for, since her parents did not take it seriously  Reassured Gerson Duenas that it is most likely her son's way of coping with change/anxiety, and provided some coping techniques to help him manage both      Review of Systems Objective:     Physical Exam    Psychiatric: calm and cooperative; mood was euthymic, affect was congruent with mood (became briefly tearful talking about her son); thought process organized, content normal; good insight and judgment; concentration, memory and behavior all appear normal   Denies SI, HI and psychosis

## 2018-11-23 NOTE — PATIENT INSTRUCTIONS
Provided referrals to therapists within insurance network  Debi High will contact this LCSW if any further assistance is needed

## 2018-11-29 DIAGNOSIS — F32.A MOOD DISORDER OF DEPRESSED TYPE: ICD-10-CM

## 2018-11-29 DIAGNOSIS — F33.41 RECURRENT MAJOR DEPRESSIVE DISORDER, IN PARTIAL REMISSION (HCC): Primary | ICD-10-CM

## 2018-11-29 DIAGNOSIS — F50.9 EATING DISORDER: ICD-10-CM

## 2018-11-29 RX ORDER — CITALOPRAM 40 MG/1
40 TABLET ORAL DAILY
Qty: 90 TABLET | Refills: 1 | Status: SHIPPED | OUTPATIENT
Start: 2018-11-29 | End: 2019-01-02 | Stop reason: SDUPTHER

## 2018-11-29 RX ORDER — CITALOPRAM 40 MG/1
1 TABLET ORAL DAILY
Refills: 0 | Status: CANCELLED | OUTPATIENT
Start: 2018-11-29

## 2018-11-29 NOTE — TELEPHONE ENCOUNTER
Pt came in to her appt today she had to leave due to picking her kid up from school  she reschedule an appt for 12/06/2018 at 3:00 pm for her 4-6 week follow up  She just requesting a refill on her Celexa  I explain to pt for further refills she has to be seen and follow up with you and make sure she comes in next week

## 2018-12-04 ENCOUNTER — OFFICE VISIT (OUTPATIENT)
Dept: URGENT CARE | Facility: MEDICAL CENTER | Age: 28
End: 2018-12-04
Payer: COMMERCIAL

## 2018-12-04 ENCOUNTER — APPOINTMENT (OUTPATIENT)
Dept: RADIOLOGY | Facility: MEDICAL CENTER | Age: 28
End: 2018-12-04
Payer: COMMERCIAL

## 2018-12-04 VITALS
HEIGHT: 63 IN | WEIGHT: 125 LBS | RESPIRATION RATE: 16 BRPM | BODY MASS INDEX: 22.15 KG/M2 | OXYGEN SATURATION: 97 % | HEART RATE: 65 BPM | TEMPERATURE: 98.7 F | DIASTOLIC BLOOD PRESSURE: 76 MMHG | SYSTOLIC BLOOD PRESSURE: 121 MMHG

## 2018-12-04 DIAGNOSIS — M25.562 ACUTE PAIN OF LEFT KNEE: Primary | ICD-10-CM

## 2018-12-04 DIAGNOSIS — M25.562 ACUTE PAIN OF LEFT KNEE: ICD-10-CM

## 2018-12-04 PROCEDURE — 99213 OFFICE O/P EST LOW 20 MIN: CPT | Performed by: FAMILY MEDICINE
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
## 2018-12-05 NOTE — PATIENT INSTRUCTIONS
Knee Exercises   AMBULATORY CARE:   What you need to know about knee exercises:  Knee exercises help strengthen the muscles around your knee  Strong muscles can help reduce pain and decrease your risk of future injury  Knee exercises also help you heal after an injury or surgery  · Start slow  These are beginning exercises  Ask your healthcare provider if you need to see a physical therapist for more advanced exercises  As you get stronger, you may be able to do more sets of each exercise or add weights  · Stop if you feel pain  It is normal to feel some discomfort at first  Regular exercise will help decrease your discomfort over time  · Do the exercises on both legs  Do this so both knees remain strong  · Warm up before you do knee exercises  Walk or ride a stationary bike for 5 or 10 minutes to warm your muscles  How to perform knee stretches safely:  Always stretch before you do strengthening exercises  Do these stretching exercises again after you do the strengthening exercises  Do these stretches 4 or 5 days a week, or as directed  · Standing calf stretch: Face a wall and place both palms flat on the wall, or hold the back of a chair for balance  Keep a slight bend in your knees  Take a big step backward with one leg  Keep your other leg directly under you  Keep both heels flat and press your hips forward  Hold the stretch for 30 seconds, and then relax for 30 seconds  Switch legs  Repeat 2 or 3 times on each leg  · Standing quadriceps stretch:  Stand and place one hand against a wall or hold the back of a chair for balance  With your weight on one leg, bend your other leg and grab your ankle  Bring your heel toward your buttocks  Hold the stretch for 30 to 60 seconds  Switch legs  Repeat 2 or 3 times on each leg  · Sitting hamstring stretch:  Sit with both legs straight in front of you  Do not point or flex your toes   Place your palms on the floor and slide your hands forward until you feel the stretch  Do not round your back  Hold the stretch for 30 seconds  Repeat 2 or 3 times  How to perform knee strengthening exercises safely:  Do these exercises 4 or 5 days a week, or as directed  · Standing half squats:  Stand with your feet shoulder-width apart  Lean your back against a wall or hold the back of a chair for balance, if needed  Slowly sit down about 10 inches, as if you are going to sit in a chair  Your body weight should be mostly over your heels  Hold the squat for 5 seconds, then rise to a standing position  Do 3 sets of 10 squats to strengthen your buttocks and thighs  · Standing hamstring curls: Face a wall and place both palms flat on the wall, or hold the back of a chair for balance  With your weight on one leg, lift your other foot as close to your buttocks as you can  Hold for 5 seconds and then lower your leg  Do 2 sets of 10 curls on each leg  This exercise strengthens the muscles in the back of your thigh  · Standing calf raises:  Face a wall and place both palms flat on the wall, or hold the back of a chair for balance  Stand up straight, and do not lean  Place all your weight on one leg by lifting the other foot off the floor  Raise the heel of the foot that is on the floor as high as you can and then lower it  Do 2 sets of 10 calf raises on each leg to strengthen your calf muscles  · Straight leg lifts:  Lie on your stomach with straight legs  Fold your arms in front of you and rest your head in your arms  Tighten your leg muscles and raise one leg as high as you can  Hold for 5 seconds, then lower your leg  Do 2 sets of 10 lifts on each leg to strengthen your buttocks  · Sitting leg lifts:  Sit in a chair  Slowly straighten and raise one leg  Squeeze your thigh muscles and hold for 5 seconds  Relax and return your foot to the floor  Do 2 sets of 10 lifts on each leg   This helps strengthen the muscles in the front of your thigh  Contact your healthcare provider if:   · You have new pain or your pain becomes worse  · You have questions or concerns about your condition or care  © 2017 [employer] Information is for End User's use only and may not be sold, redistributed or otherwise used for commercial purposes  All illustrations and images included in CareNotes® are the copyrighted property of A D A M [employer] Inc  or [employer]  The above information is an  only  It is not intended as medical advice for individual conditions or treatments  Talk to your doctor, nurse or pharmacist before following any medical regimen to see if it is safe and effective for you

## 2018-12-18 ENCOUNTER — OFFICE VISIT (OUTPATIENT)
Dept: FAMILY MEDICINE CLINIC | Facility: CLINIC | Age: 28
End: 2018-12-18
Payer: COMMERCIAL

## 2018-12-18 VITALS
HEIGHT: 63 IN | BODY MASS INDEX: 22.04 KG/M2 | OXYGEN SATURATION: 96 % | SYSTOLIC BLOOD PRESSURE: 116 MMHG | DIASTOLIC BLOOD PRESSURE: 78 MMHG | WEIGHT: 124.4 LBS | TEMPERATURE: 100.4 F | HEART RATE: 83 BPM

## 2018-12-18 DIAGNOSIS — R11.2 NAUSEA AND VOMITING, INTRACTABILITY OF VOMITING NOT SPECIFIED, UNSPECIFIED VOMITING TYPE: ICD-10-CM

## 2018-12-18 DIAGNOSIS — K52.9 GASTROENTERITIS: Primary | ICD-10-CM

## 2018-12-18 PROCEDURE — 3008F BODY MASS INDEX DOCD: CPT | Performed by: PHYSICIAN ASSISTANT

## 2018-12-18 PROCEDURE — 99213 OFFICE O/P EST LOW 20 MIN: CPT | Performed by: PHYSICIAN ASSISTANT

## 2018-12-18 RX ORDER — ONDANSETRON HYDROCHLORIDE 8 MG/1
8 TABLET, FILM COATED ORAL EVERY 8 HOURS PRN
Qty: 20 TABLET | Refills: 0 | Status: SHIPPED | OUTPATIENT
Start: 2018-12-18 | End: 2019-01-02 | Stop reason: ALTCHOICE

## 2018-12-18 NOTE — PROGRESS NOTES
Assessment/Plan:      Diagnoses and all orders for this visit:    Gastroenteritis    Nausea and vomiting, intractability of vomiting not specified, unspecified vomiting type  -     ondansetron (ZOFRAN) 8 mg tablet; Take 1 tablet (8 mg total) by mouth every 8 (eight) hours as needed for nausea or vomiting      patient is a 29y/o female here today for acute GI sxs since early this AM    Symptoms consistent with acute gastroenteritis, presumably viral   Recommendations for symptomatic treatment discussed  Zofran prescribed to help with nausea  I encouraged she stay hydrated with water, Gatorade her ginger ale taking small sips at a time or can even suck on ice chips  Resting is also very important as well as Tylenol as needed for pain or temperature reduction  During precautions discussed  I advised she stay out of work the rest of the day today as well as tomorrow  Brat diet also discussed with patient once she starts again with an appetite  Patient is to monitor symptoms over the next few days and was encouraged to call or follow up with any persistent or worsening symptoms that may require further evaluation or treatment  Chief Complaint   Patient presents with    Dizziness     patient states that she thinks she got food poisoning yesterday 12/17/18, patient states that she ate lobster mac and cheese and them after that she got up at 2 am vomiting with diarrhea   Vomiting    Diarrhea    Nausea       Subjective:     Patient ID: Sulaiman Celestin is a 34 y o  female  29y/o female here today for acute onset GI sxs since earlier today  Reports upset stomach, no pain  Flatulence  She notes diarrhea x 1, 1 vomiting episode  No blood in vomit or stool  Chills, body aches, dizziness  Taking dayquil  Review of Systems   Constitutional: Positive for activity change, appetite change, chills, fatigue and fever  Respiratory: Negative  Cardiovascular: Negative      Gastrointestinal:        As in HPI Genitourinary: Negative  Musculoskeletal: Positive for myalgias  Neurological: Positive for dizziness  The following portions of the patient's history were reviewed and updated as appropriate: allergies, current medications, past family history, past medical history, past social history, past surgical history and problem list       Objective:     Physical Exam   Constitutional: She is oriented to person, place, and time  She appears well-developed  No distress  Fatigued, appearing ill   Neck: Neck supple  Cardiovascular: Normal rate, regular rhythm and normal heart sounds  Pulmonary/Chest: Effort normal and breath sounds normal    Abdominal: Normal appearance  Bowel sounds are increased  There is no tenderness  There is no rebound and no guarding  Lymphadenopathy:     She has no cervical adenopathy  Right: No inguinal adenopathy present  Left: No inguinal adenopathy present  Neurological: She is alert and oriented to person, place, and time  Skin: Skin is intact  Psychiatric: She has a normal mood and affect  Vitals reviewed        Vitals:    12/18/18 1426   BP: 116/78   BP Location: Left arm   Patient Position: Sitting   Cuff Size: Standard   Pulse: 83   Temp: 100 4 °F (38 °C)   TempSrc: Tympanic   SpO2: 96%   Weight: 56 4 kg (124 lb 6 4 oz)   Height: 5' 3" (1 6 m)

## 2018-12-19 ENCOUNTER — TELEPHONE (OUTPATIENT)
Dept: FAMILY MEDICINE CLINIC | Facility: CLINIC | Age: 28
End: 2018-12-19

## 2019-01-02 ENCOUNTER — OFFICE VISIT (OUTPATIENT)
Dept: FAMILY MEDICINE CLINIC | Facility: CLINIC | Age: 29
End: 2019-01-02
Payer: COMMERCIAL

## 2019-01-02 VITALS
WEIGHT: 126.19 LBS | SYSTOLIC BLOOD PRESSURE: 124 MMHG | TEMPERATURE: 99.2 F | DIASTOLIC BLOOD PRESSURE: 86 MMHG | HEIGHT: 62 IN | HEART RATE: 72 BPM | OXYGEN SATURATION: 98 % | BODY MASS INDEX: 23.22 KG/M2 | RESPIRATION RATE: 18 BRPM

## 2019-01-02 DIAGNOSIS — F41.8 DEPRESSION WITH ANXIETY: Primary | ICD-10-CM

## 2019-01-02 DIAGNOSIS — F33.41 RECURRENT MAJOR DEPRESSIVE DISORDER, IN PARTIAL REMISSION (HCC): ICD-10-CM

## 2019-01-02 DIAGNOSIS — F32.A MOOD DISORDER OF DEPRESSED TYPE: ICD-10-CM

## 2019-01-02 DIAGNOSIS — F50.9 EATING DISORDER: ICD-10-CM

## 2019-01-02 PROCEDURE — 99213 OFFICE O/P EST LOW 20 MIN: CPT | Performed by: NURSE PRACTITIONER

## 2019-01-02 PROCEDURE — 3008F BODY MASS INDEX DOCD: CPT | Performed by: NURSE PRACTITIONER

## 2019-01-02 RX ORDER — CITALOPRAM 40 MG/1
40 TABLET ORAL DAILY
Qty: 30 TABLET | Refills: 0 | Status: SHIPPED | OUTPATIENT
Start: 2019-01-02 | End: 2019-02-05 | Stop reason: SDUPTHER

## 2019-01-03 NOTE — PROGRESS NOTES
[facility]    ASSESSMENT AND PLAN     1  Depression with anxiety  65-year-old female presents today for medication recheck  She was last seen on 11/01/2018  At that time, hydroxyzine was discontinued and trazodone was decreased to 25 mg at HS  She has since totally stopped taking the trazodone and feels her symptoms are being well managed on her citalopram alone  She continues to take 40 mg daily  She continues to see her therapist once weekly  She denies any suicidal ideation and/or self-harm  She has been 7 months sober  She is considering going back to school, and pursuing nursing  She states that she and her boyfriend are in a good place, and she has joint custody of her son  She is to continue on the Celexa, and contact the office should she develop any side effects and/or concerns  Next medication check will be in 6 months  2  Recurrent major depressive disorder, in partial remission (HCC)  - citalopram (CeleXA) 40 mg tablet; Take 1 tablet (40 mg total) by mouth daily  Dispense: 30 tablet; Refill: 0    3  Mood disorder of depressed type  - citalopram (CeleXA) 40 mg tablet; Take 1 tablet (40 mg total) by mouth daily  Dispense: 30 tablet; Refill: 0    4  Eating disorder  - citalopram (CeleXA) 40 mg tablet; Take 1 tablet (40 mg total) by mouth daily  Dispense: 30 tablet; Refill: 0            SUBJECTIVE       Patient ID: Josszamzam Jimenez is a 34 y o  female  Chief Complaint   Patient presents with    Medication Check Up     needs refill Citalopram       HISTORY OF PRESENT ILLNESS    Patient presents today for a medication check  She was last seen the beginning of November and some adjustments were made  She is taking 40 mg of citalopram but has stopped the hydroxyzine and trazodone  She states she feels her symptoms are being well managed  She denies any anxiety and/or depression  Denies any suicidal ideation and/or self-harm    She remains sober and is seeing a therapist weekly          The following portions of the patient's history were reviewed and updated as appropriate: allergies, current medications, past medical history and problem list     REVIEW OF SYSTEMS  Review of Systems   Constitutional: Negative  Respiratory: Negative  Cardiovascular: Negative  Neurological: Negative  Psychiatric/Behavioral: Negative  OBJECTIVE      VITAL SIGNS  /86 (BP Location: Left arm, Patient Position: Sitting, Cuff Size: Adult)   Pulse 72   Temp 99 2 °F (37 3 °C) (Tympanic)   Resp 18   Ht 5' 2 21" (1 58 m)   Wt 57 2 kg (126 lb 3 oz)   SpO2 98%   Breastfeeding? No   BMI 22 93 kg/m²       PHYSICAL EXAMINATION   Physical Exam   Constitutional: She is oriented to person, place, and time  She appears well-developed and well-nourished  Cardiovascular: Normal rate, regular rhythm and normal heart sounds  No murmur heard  Pulmonary/Chest: Effort normal and breath sounds normal  No respiratory distress  She has no wheezes  Neurological: She is alert and oriented to person, place, and time  Psychiatric: She has a normal mood and affect  Her speech is normal and behavior is normal  Judgment and thought content normal  Thought content is not paranoid and not delusional  She expresses no suicidal ideation  She expresses no suicidal plans  Seven months sober  Nursing note and vitals reviewed

## 2019-02-04 DIAGNOSIS — F50.9 EATING DISORDER: ICD-10-CM

## 2019-02-04 DIAGNOSIS — F33.41 RECURRENT MAJOR DEPRESSIVE DISORDER, IN PARTIAL REMISSION (HCC): ICD-10-CM

## 2019-02-04 DIAGNOSIS — F32.A MOOD DISORDER OF DEPRESSED TYPE: ICD-10-CM

## 2019-02-05 DIAGNOSIS — F33.41 RECURRENT MAJOR DEPRESSIVE DISORDER, IN PARTIAL REMISSION (HCC): ICD-10-CM

## 2019-02-05 DIAGNOSIS — F50.9 EATING DISORDER: ICD-10-CM

## 2019-02-05 DIAGNOSIS — F32.A MOOD DISORDER OF DEPRESSED TYPE: ICD-10-CM

## 2019-02-06 RX ORDER — CITALOPRAM 40 MG/1
TABLET ORAL
Qty: 30 TABLET | Refills: 0 | Status: SHIPPED | OUTPATIENT
Start: 2019-02-06 | End: 2019-03-21 | Stop reason: SDUPTHER

## 2019-02-12 RX ORDER — CITALOPRAM 40 MG/1
40 TABLET ORAL DAILY
Qty: 30 TABLET | Refills: 3 | Status: CANCELLED | OUTPATIENT
Start: 2019-02-12

## 2019-03-21 DIAGNOSIS — F33.41 RECURRENT MAJOR DEPRESSIVE DISORDER, IN PARTIAL REMISSION (HCC): ICD-10-CM

## 2019-03-21 DIAGNOSIS — F32.A MOOD DISORDER OF DEPRESSED TYPE: ICD-10-CM

## 2019-03-21 DIAGNOSIS — F50.9 EATING DISORDER: ICD-10-CM

## 2019-03-23 RX ORDER — CITALOPRAM 40 MG/1
40 TABLET ORAL DAILY
Qty: 30 TABLET | Refills: 4 | Status: SHIPPED | OUTPATIENT
Start: 2019-03-23

## 2019-11-17 DIAGNOSIS — B37.2 YEAST DERMATITIS: Primary | ICD-10-CM

## 2019-11-18 RX ORDER — NYSTATIN AND TRIAMCINOLONE ACETONIDE 100000; 1 [USP'U]/G; MG/G
1 OINTMENT TOPICAL 2 TIMES DAILY
Qty: 30 G | Refills: 0 | Status: SHIPPED | OUTPATIENT
Start: 2019-11-18

## 2019-11-18 NOTE — TELEPHONE ENCOUNTER
Please call patient - Health Call nurse wrote to call "her" back before sending RX - did the patient have a concern

## 2020-05-21 ENCOUNTER — NURSE TRIAGE (OUTPATIENT)
Dept: OTHER | Facility: OTHER | Age: 30
End: 2020-05-21

## 2022-09-16 ENCOUNTER — TELEPHONE (OUTPATIENT)
Dept: FAMILY MEDICINE CLINIC | Facility: CLINIC | Age: 32
End: 2022-09-16

## 2022-10-02 NOTE — TELEPHONE ENCOUNTER
10/02/22 5:04 PM     Thank you for your request      PCP to be removed at office level for this scenario - if patient not seen by any provider at the office and provider/office not listed on insurance card on file (or if no insurance card on file, can also remove)  Please add comment when removing PCP  This message will now be completed      Thank you  Parish [place]

## 2024-03-18 ENCOUNTER — APPOINTMENT (OUTPATIENT)
Dept: LAB | Facility: CLINIC | Age: 34
End: 2024-03-18

## 2024-03-18 ENCOUNTER — OCCMED (OUTPATIENT)
Dept: URGENT CARE | Facility: CLINIC | Age: 34
End: 2024-03-18

## 2024-03-18 DIAGNOSIS — Z02.1 PRE-EMPLOYMENT EXAMINATION: Primary | ICD-10-CM

## 2024-03-18 DIAGNOSIS — Z02.1 PRE-EMPLOYMENT EXAMINATION: ICD-10-CM

## 2024-03-18 LAB
MEV IGG SER QL IA: ABNORMAL
MUV IGG SER QL IA: NORMAL
RUBV IGG SERPL IA-ACNC: <10 IU/ML
VZV IGG SER QL IA: NORMAL

## 2024-03-18 PROCEDURE — 86480 TB TEST CELL IMMUN MEASURE: CPT

## 2024-03-18 PROCEDURE — 86765 RUBEOLA ANTIBODY: CPT

## 2024-03-18 PROCEDURE — 86787 VARICELLA-ZOSTER ANTIBODY: CPT

## 2024-03-18 PROCEDURE — 86735 MUMPS ANTIBODY: CPT

## 2024-03-18 PROCEDURE — 36415 COLL VENOUS BLD VENIPUNCTURE: CPT

## 2024-03-18 PROCEDURE — 86762 RUBELLA ANTIBODY: CPT

## 2024-03-19 LAB
GAMMA INTERFERON BACKGROUND BLD IA-ACNC: 0.06 IU/ML
M TB IFN-G BLD-IMP: NEGATIVE
M TB IFN-G CD4+ BCKGRND COR BLD-ACNC: 0.02 IU/ML
M TB IFN-G CD4+ BCKGRND COR BLD-ACNC: 0.04 IU/ML
MITOGEN IGNF BCKGRD COR BLD-ACNC: 9.74 IU/ML

## 2024-04-16 ENCOUNTER — TELEPHONE (OUTPATIENT)
Age: 34
End: 2024-04-16

## 2024-04-16 NOTE — TELEPHONE ENCOUNTER
Could a nurse please call this patient back directly? I tried to warm transfer to the office but I only got clerical and they were not able to give me a definitive answer. She has a small window of time to get her depo shot. She has a few questions about her window of time, where she gets the solution, if our office carries the solution, (she came from  and they always carried it for her) if she can get her shot at the time of her new pt apt. I gave her very generic answers but it would be better if a nurse called her for clear answers.

## 2024-04-16 NOTE — TELEPHONE ENCOUNTER
Spoke to pt, she said she called [facility] to get her window of administration and they also provided her with 1 refill. Instructed her to bring medication to her visit and as long as everything checks out when we review her history, we can give it at her visit.    Pt informed we do not carry this medication in office.

## 2024-05-13 ENCOUNTER — OFFICE VISIT (OUTPATIENT)
Dept: FAMILY MEDICINE CLINIC | Facility: CLINIC | Age: 34
End: 2024-05-13
Payer: COMMERCIAL

## 2024-05-13 VITALS
HEART RATE: 60 BPM | DIASTOLIC BLOOD PRESSURE: 84 MMHG | SYSTOLIC BLOOD PRESSURE: 114 MMHG | OXYGEN SATURATION: 99 % | HEIGHT: 63 IN | BODY MASS INDEX: 22.43 KG/M2 | WEIGHT: 126.6 LBS | TEMPERATURE: 98.5 F

## 2024-05-13 DIAGNOSIS — F33.41 RECURRENT MAJOR DEPRESSIVE DISORDER, IN PARTIAL REMISSION (HCC): ICD-10-CM

## 2024-05-13 DIAGNOSIS — F43.10 PTSD (POST-TRAUMATIC STRESS DISORDER): ICD-10-CM

## 2024-05-13 DIAGNOSIS — Z95.818 IMPLANTABLE LOOP RECORDER PRESENT: ICD-10-CM

## 2024-05-13 DIAGNOSIS — B00.9 HERPES: ICD-10-CM

## 2024-05-13 DIAGNOSIS — R56.9 SEIZURE-LIKE ACTIVITY (HCC): ICD-10-CM

## 2024-05-13 DIAGNOSIS — Z01.818 TUBAL LIGATION EVALUATION: ICD-10-CM

## 2024-05-13 DIAGNOSIS — R55 SYNCOPE AND COLLAPSE: ICD-10-CM

## 2024-05-13 DIAGNOSIS — Z13.0 SCREENING FOR DEFICIENCY ANEMIA: ICD-10-CM

## 2024-05-13 DIAGNOSIS — Z30.42 ON DEPO-PROVERA FOR CONTRACEPTION: ICD-10-CM

## 2024-05-13 DIAGNOSIS — Z11.4 SCREENING FOR HIV (HUMAN IMMUNODEFICIENCY VIRUS): ICD-10-CM

## 2024-05-13 DIAGNOSIS — Z00.00 ADULT GENERAL MEDICAL EXAMINATION: Primary | ICD-10-CM

## 2024-05-13 DIAGNOSIS — F41.9 ANXIETY: ICD-10-CM

## 2024-05-13 DIAGNOSIS — F50.9 EATING DISORDER, UNSPECIFIED TYPE: ICD-10-CM

## 2024-05-13 DIAGNOSIS — Z13.1 SCREENING FOR DIABETES MELLITUS: ICD-10-CM

## 2024-05-13 DIAGNOSIS — Z13.29 SCREENING FOR THYROID DISORDER: ICD-10-CM

## 2024-05-13 PROBLEM — R51.9 CHRONIC DAILY HEADACHE: Status: ACTIVE | Noted: 2022-10-04

## 2024-05-13 PROBLEM — R00.2 PALPITATIONS: Status: ACTIVE | Noted: 2022-10-06

## 2024-05-13 PROBLEM — M25.50 POLYARTHRALGIA: Status: ACTIVE | Noted: 2021-06-02

## 2024-05-13 PROBLEM — A63.0 ANOGENITAL HUMAN PAPILLOMA VIRUS (HPV) INFECTION: Status: ACTIVE | Noted: 2023-12-06

## 2024-05-13 LAB — SL AMB POCT URINE HCG: NEGATIVE

## 2024-05-13 PROCEDURE — 96372 THER/PROPH/DIAG INJ SC/IM: CPT

## 2024-05-13 PROCEDURE — 99385 PREV VISIT NEW AGE 18-39: CPT

## 2024-05-13 PROCEDURE — 81025 URINE PREGNANCY TEST: CPT

## 2024-05-13 RX ORDER — MEDROXYPROGESTERONE ACETATE 150 MG/ML
150 INJECTION, SUSPENSION INTRAMUSCULAR
Qty: 1 ML | Refills: 0 | Status: SHIPPED | OUTPATIENT
Start: 2024-05-13

## 2024-05-13 RX ORDER — VALACYCLOVIR HYDROCHLORIDE 500 MG/1
500 TABLET, FILM COATED ORAL AS NEEDED
COMMUNITY
Start: 2023-11-17

## 2024-05-13 RX ORDER — MEDROXYPROGESTERONE ACETATE 150 MG/ML
150 INJECTION, SUSPENSION INTRAMUSCULAR ONCE
Status: COMPLETED | OUTPATIENT
Start: 2024-05-13 | End: 2024-05-13

## 2024-05-13 RX ADMIN — MEDROXYPROGESTERONE ACETATE 150 MG: 150 INJECTION, SUSPENSION INTRAMUSCULAR at 10:04

## 2024-05-13 NOTE — ASSESSMENT & PLAN NOTE
Patient presents today to establish care with our practice and is due for an annual physical. Patient's medical history and medication list were reviewed and updated. Annual physical questionnaire was given to review current diet, exercise level, sleep health, dental health, visual health, hearing status. 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     Preventative health needs were reviewed and assessed today:   Immunizations:   Flu - out of season  COVID - defers  Tdap - UTD     Pap - UTD, referral placed today for our OBGYN associates as she would like to discuss tubal ligation with them.     Agreeable to get lab work completed, will get A1c and Lipid panel completed through [facility] and will add on CBC, CMP, TSH, and HIV screening to this lab order. Patient will be notified with lab results.     Diet/Exercise:  Recommend at least 150 minutes of moderate to vigorous cardiovascular exercise such as running, walking, biking, or swimming. Proper exercise should also include strength training 1-2 days/week to ensure good bone health and muscle tone.   Recommend balanced meals with attention to protein, fat and carbohydrate intake. Recommend decreasing sugars and saturated fats in the diet, and replacing these foods with healthier fruits and vegetables. Recommend whole grains and low-fat milk to ensure proper vitamin D and calcium intake. Avoid added sugars and excess sodium. Pay attention to sugary drinks that may increase total calorie intake in a day, such as iced coffee, iced tea, and sodas. Recommended use of MyPlate.gov to determine proper daily values of macronutrients based on patient's height, weight and age.     Patient to follow up in 1 year, or sooner with any acute concerns.

## 2024-05-13 NOTE — ASSESSMENT & PLAN NOTE
Depo shot given today in clinic, Q3 months. Negative pregnancy test here prior to injection being given. Recommend nurse visit in 3 months for next dose, urine HCG beforehand.

## 2024-05-13 NOTE — ASSESSMENT & PLAN NOTE
Has had a loop recorder placed since 2022 due to syncopal symptoms, she is requiring device checks and has not been in with a cardiologist yet. Referral given today for cardiology, as we do not do device checks here.

## 2024-05-13 NOTE — PROGRESS NOTES
ADULT ANNUAL PHYSICAL  [facility]    NAME: Samia Bull  AGE: 34 y.o. SEX: female  : 1990     DATE: 2024     Assessment and Plan:     Problem List Items Addressed This Visit       Recurrent major depressive disorder, in partial remission (HCC)     Currently stable off of medication, uses medical marijuana and sees a therapist twice a week.          On Depo-Provera for contraception     Depo shot given today in clinic, Q3 months. Negative pregnancy test here prior to injection being given. Recommend nurse visit in 3 months for next dose, urine HCG beforehand.          Relevant Medications    medroxyPROGESTERone (Depo-Provera) 150 mg/mL injection    medroxyPROGESTERone acetate (DEPO-PROVERA SYRINGE) IM injection 150 mg (Completed)    Other Relevant Orders    POCT urine HCG (Completed)    Eating disorder     Currently stable off of medication, uses medical marijuana and sees a therapist twice a week.          Anxiety     Currently stable off of medication, uses medical marijuana and sees a therapist twice a week.          Herpes     Takes Valtrex as needed for outbreaks.          Relevant Medications    valACYclovir (VALTREX) 500 mg tablet    PTSD (post-traumatic stress disorder)     Currently stable off of medication, uses medical marijuana and sees a therapist twice a week.          Seizure-like activity (HCC)     Has had seizure like activity in the past and was evaluated by [facility] neurology who determined her symptoms were not seizure related, and cleared her from their perspective.          Syncope and collapse     Has had a loop recorder placed since  due to syncopal symptoms, she is requiring device checks and has not been in with a cardiologist yet. Referral given today for cardiology, as we do not do device checks here.          Adult general medical examination - Primary     Patient presents today to establish care with our  practice and is due for an annual physical. Patient's medical history and medication list were reviewed and updated. Annual physical questionnaire was given to review current diet, exercise level, sleep health, dental health, visual health, hearing status. 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.     Preventative health needs were reviewed and assessed today:   Immunizations:   Flu - out of season  COVID - defers  Tdap - UTD     Pap - UTD, referral placed today for our OBGYN associates as she would like to discuss tubal ligation with them.     Agreeable to get lab work completed, will get A1c and Lipid panel completed through [facility] and will add on CBC, CMP, TSH, and HIV screening to this lab order. Patient will be notified with lab results.     Diet/Exercise:  Recommend at least 150 minutes of moderate to vigorous cardiovascular exercise such as running, walking, biking, or swimming. Proper exercise should also include strength training 1-2 days/week to ensure good bone health and muscle tone.   Recommend balanced meals with attention to protein, fat and carbohydrate intake. Recommend decreasing sugars and saturated fats in the diet, and replacing these foods with healthier fruits and vegetables. Recommend whole grains and low-fat milk to ensure proper vitamin D and calcium intake. Avoid added sugars and excess sodium. Pay attention to sugary drinks that may increase total calorie intake in a day, such as iced coffee, iced tea, and sodas. Recommended use of MyPlate.gov to determine proper daily values of macronutrients based on patient's height, weight and age.     Patient to follow up in 1 year, or sooner with any acute concerns.           Other Visit Diagnoses       Implantable loop recorder present        Relevant Orders    Ambulatory Referral to Cardiology    Tubal ligation evaluation        Relevant Orders    Ambulatory Referral to Obstetrics / Gynecology    Screening for  deficiency anemia        Relevant Orders    CBC and differential    Screening for diabetes mellitus        Relevant Orders    Comprehensive metabolic panel    Screening for thyroid disorder        Relevant Orders    TSH, 3rd generation with Free T4 reflex    Screening for HIV (human immunodeficiency virus)        Relevant Orders    HIV 1/2 AG/AB w Reflex [facility] for 2 yr old and above            Immunizations and preventive care screenings were discussed with patient today. Appropriate education was printed on patient's after visit summary.    Counseling:  Alcohol/drug use: discussed moderation in alcohol intake, the recommendations for healthy alcohol use, and avoidance of illicit drug use.  Dental Health: discussed importance of regular tooth brushing, flossing, and dental visits.  Injury prevention: discussed safety/seat belts, safety helmets, smoke detectors, carbon dioxide detectors, and smoking near bedding or upholstery.  Sexual health: discussed sexually transmitted diseases, partner selection, use of condoms, avoidance of unintended pregnancy, and contraceptive alternatives.  Exercise: the importance of regular exercise/physical activity was discussed. Recommend exercise 3-5 times per week for at least 30 minutes.       Depression Screening and Follow-up Plan: Patient's depression screening was positive with a PHQ-9 score of 6. Continue regular follow-up with their mental health provider who is managing their mental health condition(s).     Tobacco Cessation Counseling: Tobacco cessation counseling was provided. The patient is sincerely urged to quit consumption of tobacco. She is not ready to quit tobacco.         No follow-ups on file.     Chief Complaint:     Chief Complaint   Patient presents with    [facility]     Employee wellness visit, requesting depo injection.  Also states she has loop recorder from [facility] and requesting to have testing ordered      History of Present Illness:     Adult Annual  Physical   Patient here for a comprehensive physical exam. The patient reports problems - loop recorder check up, depo injection, establish care .    Diet and Physical Activity  Diet/Nutrition: well balanced diet, limited junk food, low fat diet, consuming 3-5 servings of fruits/vegetables daily, adequate fiber intake, and adequate whole grain intake.   Exercise: moderate cardiovascular exercise, strength training exercises, 5-7 times a week on average, and 1-2 hours on average.      Depression Screening  PHQ-2/9 Depression Screening    Little interest or pleasure in doing things: 0 - not at all  Feeling down, depressed, or hopeless: 1 - several days  Trouble falling or staying asleep, or sleeping too much: 1 - several days  Feeling tired or having little energy: 1 - several days  Poor appetite or overeatin - several days  Feeling bad about yourself - or that you are a failure or have let yourself or your family down: 1 - several days  Trouble concentrating on things, such as reading the newspaper or watching television: 0 - not at all  Moving or speaking so slowly that other people could have noticed. Or the opposite - being so fidgety or restless that you have been moving around a lot more than usual: 0 - not at all  Thoughts that you would be better off dead, or of hurting yourself in some way: 1 - several days  PHQ-9 Score: 6  PHQ-9 Interpretation: Mild depression       General Health  Sleep: sleeps well and gets 7-8 hours of sleep on average.   Hearing: normal - right; left ear worse than usual  Vision: vision problems: astigmatism, most recent eye exam >1 year ago, and wears glasses.   Dental: no dental visits for >1 year and brushes teeth twice daily.       /GYN Health  Follows with gynecology? no   Last menstrual period: very irregular with Depo shot - since   Contraceptive method: injectable contraception.  History of STDs?: yes, chlamydia, herpes     Review of Systems:     Review of Systems    Constitutional:  Negative for chills, fever and unexpected weight change.   Respiratory:  Negative for cough, chest tightness and shortness of breath.    Cardiovascular:  Negative for chest pain and palpitations.   Gastrointestinal:  Negative for abdominal pain, blood in stool and vomiting.   Genitourinary:  Negative for dysuria, hematuria and menstrual problem.   Musculoskeletal:  Negative for arthralgias, back pain and myalgias.   Neurological:  Negative for dizziness, seizures, syncope and headaches.   Hematological:  Does not bruise/bleed easily.   Psychiatric/Behavioral:  Negative for behavioral problems and confusion.    All other systems reviewed and are negative.     Past Medical History:     Past Medical History:   Diagnosis Date    Anxiety     Depression     Eating disorder     GERD (gastroesophageal reflux disease)     Headache(784.0)     Herpes simplex infection     Infectious viral hepatitis     Palpitations     Sciatica     Substance abuse (HCC)     Urinary tract infection       Past Surgical History:     Past Surgical History:   Procedure Laterality Date    WISDOM TOOTH EXTRACTION        Social History:     Social History     Socioeconomic History    Marital status: Single     Spouse name: None    Number of children: None    Years of education: None    Highest education level: None   Occupational History    None   Tobacco Use    Smoking status: Former     Current packs/day: 0.00     Average packs/day: 0.3 packs/day for 15.0 years (3.8 ttl pk-yrs)     Types: Cigarettes     Quit date:      Years since quittin.3    Smokeless tobacco: Never   Vaping Use    Vaping status: Every Day    Substances: THC, CBD   Substance and Sexual Activity    Alcohol use: No    Drug use: Yes     Types: Marijuana     Comment: Medical marijuana    Sexual activity: Yes     Partners: Male     Birth control/protection: Injection     Comment: In [employer] uses birth control   Other Topics Concern    None   Social  "History Narrative    None     Social Determinants of Health     Financial Resource Strain: Not on file   Food Insecurity: Not on file   Transportation Needs: Not on file   Physical Activity: Not on file   Stress: Not on file   Social Connections: Not on file   Intimate Partner Violence: Not on file   Housing Stability: Not on file      Family History:     Family History   Problem Relation Age of Onset    Hypertension Mother     Arthritis Mother       Current Medications:     Current Outpatient Medications   Medication Sig Dispense Refill    medroxyPROGESTERone (Depo-Provera) 150 mg/mL injection Inject 1 mL (150 mg total) into a muscle every 3 (three) months 1 mL 0    valACYclovir (VALTREX) 500 mg tablet Take 500 mg by mouth daily       No current facility-administered medications for this visit.      Allergies:     Allergies   Allergen Reactions    Lactose - Food Allergy Diarrhea    Petrolatum Itching    White Petrolatum Rash      Physical Exam:     /84 (BP Location: Left arm, Patient Position: Sitting, Cuff Size: Adult)   Pulse 60   Temp 98.5 °F (36.9 °C)   Ht 5' 2.5\" (1.588 m)   Wt 57.4 kg (126 lb 9.6 oz)   SpO2 99%   BMI 22.79 kg/m²     Physical Exam  Vitals and nursing note reviewed.   Constitutional:       General: She is not in acute distress.     Appearance: Normal appearance. She is well-developed and normal weight. She is not ill-appearing.   HENT:      Head: Normocephalic and atraumatic.      Right Ear: Tympanic membrane normal.      Left Ear: Tympanic membrane normal.      Nose: Nose normal.      Mouth/Throat:      Mouth: Mucous membranes are moist.      Pharynx: Oropharynx is clear. No posterior oropharyngeal erythema.   Eyes:      Pupils: Pupils are equal, round, and reactive to light.   Cardiovascular:      Rate and Rhythm: Normal rate and regular rhythm.      Pulses: Normal pulses.      Heart sounds: No murmur heard.  Pulmonary:      Effort: Pulmonary effort is normal. No respiratory " distress.      Breath sounds: Normal breath sounds.   Abdominal:      General: Bowel sounds are normal. There is no distension.      Palpations: Abdomen is soft.      Tenderness: There is no abdominal tenderness.   Musculoskeletal:         General: No swelling. Normal range of motion.      Cervical back: Normal range of motion and neck supple. No rigidity or tenderness.      Right lower leg: No edema.      Left lower leg: No edema.   Lymphadenopathy:      Cervical: No cervical adenopathy.   Skin:     General: Skin is warm and dry.   Neurological:      Mental Status: She is alert and oriented to person, place, and time. Mental status is at baseline.   Psychiatric:         Mood and Affect: Mood normal.         Behavior: Behavior normal.         Cognition and Memory: Cognition normal.         Judgment: Judgment normal.          Elizabeth Shoemaker PA-C   [facility]

## 2024-05-13 NOTE — ASSESSMENT & PLAN NOTE
Has had seizure like activity in the past and was evaluated by [facility] neurology who determined her symptoms were not seizure related, and cleared her from their perspective.

## 2024-05-18 ENCOUNTER — APPOINTMENT (OUTPATIENT)
Dept: LAB | Facility: CLINIC | Age: 34
End: 2024-05-18
Payer: COMMERCIAL

## 2024-05-18 DIAGNOSIS — Z13.1 SCREENING FOR DIABETES MELLITUS: ICD-10-CM

## 2024-05-18 DIAGNOSIS — Z11.4 SCREENING FOR HIV (HUMAN IMMUNODEFICIENCY VIRUS): ICD-10-CM

## 2024-05-18 DIAGNOSIS — Z13.0 SCREENING FOR DEFICIENCY ANEMIA: ICD-10-CM

## 2024-05-18 DIAGNOSIS — Z13.29 SCREENING FOR THYROID DISORDER: ICD-10-CM

## 2024-05-18 DIAGNOSIS — Z00.8 HEALTH EXAMINATION IN POPULATION SURVEYS: ICD-10-CM

## 2024-05-18 DIAGNOSIS — Z00.8 ENCOUNTER FOR OTHER GENERAL EXAMINATION: ICD-10-CM

## 2024-05-18 LAB
ALBUMIN SERPL BCP-MCNC: 4.3 G/DL (ref 3.5–5)
ALP SERPL-CCNC: 54 U/L (ref 34–104)
ALT SERPL W P-5'-P-CCNC: 19 U/L (ref 7–52)
ANION GAP SERPL CALCULATED.3IONS-SCNC: 8 MMOL/L (ref 4–13)
AST SERPL W P-5'-P-CCNC: 17 U/L (ref 13–39)
BASOPHILS # BLD AUTO: 0.05 THOUSANDS/ÂΜL (ref 0–0.1)
BASOPHILS NFR BLD AUTO: 1 % (ref 0–1)
BILIRUB SERPL-MCNC: 0.45 MG/DL (ref 0.2–1)
BUN SERPL-MCNC: 13 MG/DL (ref 5–25)
CALCIUM SERPL-MCNC: 9.2 MG/DL (ref 8.4–10.2)
CHLORIDE SERPL-SCNC: 105 MMOL/L (ref 96–108)
CHOLEST SERPL-MCNC: 164 MG/DL
CO2 SERPL-SCNC: 26 MMOL/L (ref 21–32)
CREAT SERPL-MCNC: 0.69 MG/DL (ref 0.6–1.3)
EOSINOPHIL # BLD AUTO: 0.04 THOUSAND/ÂΜL (ref 0–0.61)
EOSINOPHIL NFR BLD AUTO: 1 % (ref 0–6)
ERYTHROCYTE [DISTWIDTH] IN BLOOD BY AUTOMATED COUNT: 12.3 % (ref 11.6–15.1)
EST. AVERAGE GLUCOSE BLD GHB EST-MCNC: 111 MG/DL
GFR SERPL CREATININE-BSD FRML MDRD: 115 ML/MIN/1.73SQ M
GLUCOSE P FAST SERPL-MCNC: 90 MG/DL (ref 65–99)
HBA1C MFR BLD: 5.5 %
HCT VFR BLD AUTO: 42.2 % (ref 34.8–46.1)
HDLC SERPL-MCNC: 68 MG/DL
HGB BLD-MCNC: 13.4 G/DL (ref 11.5–15.4)
IMM GRANULOCYTES # BLD AUTO: 0.01 THOUSAND/UL (ref 0–0.2)
IMM GRANULOCYTES NFR BLD AUTO: 0 % (ref 0–2)
LDLC SERPL CALC-MCNC: 88 MG/DL (ref 0–100)
LYMPHOCYTES # BLD AUTO: 1.63 THOUSANDS/ÂΜL (ref 0.6–4.47)
LYMPHOCYTES NFR BLD AUTO: 40 % (ref 14–44)
MCH RBC QN AUTO: 27.8 PG (ref 26.8–34.3)
MCHC RBC AUTO-ENTMCNC: 31.8 G/DL (ref 31.4–37.4)
MCV RBC AUTO: 88 FL (ref 82–98)
MONOCYTES # BLD AUTO: 0.31 THOUSAND/ÂΜL (ref 0.17–1.22)
MONOCYTES NFR BLD AUTO: 8 % (ref 4–12)
NEUTROPHILS # BLD AUTO: 2.09 THOUSANDS/ÂΜL (ref 1.85–7.62)
NEUTS SEG NFR BLD AUTO: 50 % (ref 43–75)
NONHDLC SERPL-MCNC: 96 MG/DL
NRBC BLD AUTO-RTO: 0 /100 WBCS
PLATELET # BLD AUTO: 280 THOUSANDS/UL (ref 149–390)
PMV BLD AUTO: 9.5 FL (ref 8.9–12.7)
POTASSIUM SERPL-SCNC: 4 MMOL/L (ref 3.5–5.3)
PROT SERPL-MCNC: 7 G/DL (ref 6.4–8.4)
RBC # BLD AUTO: 4.82 MILLION/UL (ref 3.81–5.12)
SODIUM SERPL-SCNC: 139 MMOL/L (ref 135–147)
TRIGL SERPL-MCNC: 40 MG/DL
TSH SERPL DL<=0.05 MIU/L-ACNC: 1.44 UIU/ML (ref 0.45–4.5)
WBC # BLD AUTO: 4.13 THOUSAND/UL (ref 4.31–10.16)

## 2024-05-18 PROCEDURE — 85025 COMPLETE CBC W/AUTO DIFF WBC: CPT

## 2024-05-18 PROCEDURE — 80053 COMPREHEN METABOLIC PANEL: CPT

## 2024-05-18 PROCEDURE — 84443 ASSAY THYROID STIM HORMONE: CPT

## 2024-05-18 PROCEDURE — 80061 LIPID PANEL: CPT

## 2024-05-18 PROCEDURE — 87389 HIV-1 AG W/HIV-1&-2 AB AG IA: CPT

## 2024-05-18 PROCEDURE — 36415 COLL VENOUS BLD VENIPUNCTURE: CPT

## 2024-05-18 PROCEDURE — 83036 HEMOGLOBIN GLYCOSYLATED A1C: CPT

## 2024-05-19 LAB
HIV 1+2 AB+HIV1 P24 AG SERPL QL IA: NORMAL
HIV 2 AB SERPL QL IA: NORMAL
HIV1 AB SERPL QL IA: NORMAL
HIV1 P24 AG SERPL QL IA: NORMAL

## 2024-05-20 ENCOUNTER — PATIENT MESSAGE (OUTPATIENT)
Dept: CARDIOLOGY CLINIC | Facility: CLINIC | Age: 34
End: 2024-05-20

## 2024-05-20 ENCOUNTER — CONSULT (OUTPATIENT)
Dept: CARDIOLOGY CLINIC | Facility: CLINIC | Age: 34
End: 2024-05-20
Payer: COMMERCIAL

## 2024-05-20 VITALS
BODY MASS INDEX: 22.79 KG/M2 | HEART RATE: 53 BPM | WEIGHT: 126.6 LBS | DIASTOLIC BLOOD PRESSURE: 76 MMHG | SYSTOLIC BLOOD PRESSURE: 105 MMHG

## 2024-05-20 DIAGNOSIS — R00.2 PALPITATIONS: Primary | ICD-10-CM

## 2024-05-20 DIAGNOSIS — F43.10 PTSD (POST-TRAUMATIC STRESS DISORDER): ICD-10-CM

## 2024-05-20 DIAGNOSIS — R55 SYNCOPE AND COLLAPSE: ICD-10-CM

## 2024-05-20 DIAGNOSIS — Z95.818 IMPLANTABLE LOOP RECORDER PRESENT: ICD-10-CM

## 2024-05-20 PROCEDURE — 93000 ELECTROCARDIOGRAM COMPLETE: CPT

## 2024-05-20 PROCEDURE — 99244 OFF/OP CNSLTJ NEW/EST MOD 40: CPT

## 2024-05-20 NOTE — PROGRESS NOTES
Cardiology Consultation   MD Harley Polk MD, FACC  Henry Parisi DO, [facility] FACP Ather Mansoor, MD Rujul Patel, DO, [facility]  Junaid Puentes DO, [facility]  -------------------------------------------------------------------  [facility]  [address] [place] [address]  Phone: [phone]  Fax: [phone]  05/20/24  Samia Bull  YOB: 1990   MRN: [id number]      Referring Physician: Elizabeth Shoemaker PA-C  [address]   [address]  [place] [address]     HPI:  I am seeing this patient in cardiology consultation for:  Syncope    Samia Bull is a 34 y.o. female with:   Recovering etoh abuse  Former tobacco use  Uses medical marijuana  Depression  Anxiety  Eating d/o  Syncope - unexplained - loop in place - ibabybox device - negative for arrhythmia x 2 years    Tobacco: former  Alcohol: former  Drugs: medical marijauna    Family History: Father - MI, Mother - murmur    She presents today for evaluation cardiology.  She now works for [employer] so she is switching all of her doctors to [facility].  She has a loop recorder in place which was placed 2 years ago for an episode of syncope that occurred when she was at her son's soccer practice.  This is still unclear as to what actually happened is no inciting cause was ever found.  Her loop recorder's have been negative for arrhythmia for the past 2 years.  Cardiac workup at [facility] was unremarkable.  Episodes of syncope have not recurred although she does note some dizziness and lightheadedness when she exercises.  She states she only drinks about 24 ounces of water per day    Review of Systems   Constitutional:  Negative for chills and fever.   HENT:  Negative for facial swelling and sore throat.    Eyes:  Negative for visual disturbance.   Respiratory:  Negative for cough, chest tightness, shortness of breath and wheezing.    Cardiovascular:  Positive for  palpitations. Negative for chest pain and leg swelling.   Gastrointestinal:  Negative for abdominal pain, blood in stool, constipation, diarrhea, nausea and vomiting.   Endocrine: Negative for cold intolerance and heat intolerance.   Genitourinary:  Negative for decreased urine volume, difficulty urinating, dysuria and hematuria.   Musculoskeletal:  Negative for arthralgias, back pain and myalgias.   Skin:  Negative for rash.   Neurological:  Positive for syncope and headaches. Negative for dizziness, weakness and numbness.   Psychiatric/Behavioral:  Negative for agitation, behavioral problems and confusion. The patient is nervous/anxious.         OBJECTIVE  Vitals:    05/20/24 0842   BP: 105/76   Pulse: (!) 53       Physical Exam   Constitutional: awake, alert and oriented, in no acute distress, no obvious deformities  Head: Normocephalic, without obvious abnormality, atraumatic  Eyes: conjunctivae clear and moist. Sclera anicteric. No xanthelasmas. Pupils equal bilaterally. Extraocular motions are full.  Ear nose mouth and throat: ears are symmetrical bilaterally, hearing appears to be equal bilaterally, no nasal discharge or epistaxis, oropharynx is clear with moist mucous membranes  Neck: Trachea is midline, neck is supple, no thyromegaly or significant lymphadenopathy, there is full range of motion.  Lungs: clear to auscultation bilaterally, no wheezes, no rales, no rhonchi, no accessory muscle use, breathing is nonlabored  Heart: regular rate and rhythm, S1, S2 normal, no murmur, no click, no rub and no gallop, no lower extremity edema  Abdomen: soft, non-tender; bowel sounds normal; no masses, no organomegaly  Psychiatric: Patient is oriented to time, place, person, mood/affect is negative for depression, anxiety, agitation, appears to have appropriate insight  Skin: Skin is warm, dry, intact. No obvious rashes or lesions on exposed extremities. Nail beds are pink with no cyanosis or  clubbing.    EKG:  Results for orders placed or performed in visit on 05/20/24   POCT ECG    Impression    Sinus bradycardia, otherwise normal        The ASCVD Risk score (Maddie CHAMPION, et al., 2019) failed to calculate for the following reasons:    The 2019 ASCVD risk score is only valid for ages 40 to 79    IMPRESSION:  Syncope - unexplained - loop in place - [employer] sci device - negative for arrhythmia x 2 years  Recovering etoh abuse  Former tobacco use  Uses medical marijuana  Depression  Anxiety  Eating d/o    DISCUSSION/RECOMMENDATIONS:  She presents today to establish care with cardiology at [facility]  Her loop recorder has been negative for sustained arrhythmias as the cause of her syncope  May have been related to volume depletion?  She states that she only drinks about 24 ounces of water per day.  I asked her to increase this by at least double if not more to target a total of 2 L of water intake per day.  This may be why she is feeling lightheaded and dizzy when doing cardiovascular exercise.  She has increased her salt intake as well.  Her cardiac physical exam is unremarkable, ECG is normal  Will hold off on repeat cardiac imaging  She will be established in our device clinic to follow her loop recorder interrogations    Junaid Puentes DO, [facility], [facility]  --------------------------------------------------------------------------------  TREADMILL STRESS  No results found for this or any previous visit.     ----------------------------------------------------------------------------------------------  NUCLEAR STRESS TEST: No results found for this or any previous visit.    No results found for this or any previous visit.      --------------------------------------------------------------------------------  CATH:  No results found for this or any previous visit.    --------------------------------------------------------------------------------  ECHO:   No results found for this or any previous visit.    No  results found for this or any previous visit.    --------------------------------------------------------------------------------  HOLTER  No results found for this or any previous visit.    --------------------------------------------------------------------------------  CAROTIDS  No results found for this or any previous visit.       Diagnoses and all orders for this visit:    Palpitations  -     POCT ECG    Implantable loop recorder present  -     Ambulatory Referral to Cardiology    Syncope and collapse    PTSD (post-traumatic stress disorder)       ======================================================    Past Medical History:   Diagnosis Date   • Anxiety    • Depression    • Eating disorder    • GERD (gastroesophageal reflux disease)    • Headache(784.0)    • Herpes simplex infection    • Infectious viral hepatitis    • Palpitations    • Sciatica    • Substance abuse (HCC)    • Urinary tract infection      Past Surgical History:   Procedure Laterality Date   • WISDOM TOOTH EXTRACTION           Medications  Current Outpatient Medications   Medication Sig Dispense Refill   • medroxyPROGESTERone (Depo-Provera) 150 mg/mL injection Inject 1 mL (150 mg total) into a muscle every 3 (three) months 1 mL 0   • valACYclovir (VALTREX) 500 mg tablet Take 500 mg by mouth as needed       No current facility-administered medications for this visit.        Allergies   Allergen Reactions   • Lactose - Food Allergy Diarrhea   • Petrolatum Itching   • White Petrolatum Rash       Social History     Socioeconomic History   • Marital status: Single     Spouse name: Not on file   • Number of children: Not on file   • Years of education: Not on file   • Highest education level: Not on file   Occupational History   • Not on file   Tobacco Use   • Smoking status: Former     Current packs/day: 0.00     Average packs/day: 0.3 packs/day for 15.0 years (3.8 ttl pk-yrs)     Types: Cigarettes     Quit date:      Years since quittin.3  "  • Smokeless tobacco: Never   Vaping Use   • Vaping status: Every Day   • Substances: THC, CBD   Substance and Sexual Activity   • Alcohol use: No   • Drug use: Yes     Types: Marijuana     Comment: Medical marijuana   • Sexual activity: Yes     Partners: Male     Birth control/protection: Injection     Comment: In [employer] uses birth control   Other Topics Concern   • Not on file   Social History Narrative   • Not on file     Social Determinants of Health     Financial Resource Strain: Not on file   Food Insecurity: Not on file   Transportation Needs: Not on file   Physical Activity: Not on file   Stress: Not on file   Social Connections: Not on file   Intimate Partner Violence: Not on file   Housing Stability: Not on file        Family History   Problem Relation Age of Onset   • Hypertension Mother    • Arthritis Mother        Lab Results   Component Value Date    WBC 4.13 (L) 05/18/2024    HGB 13.4 05/18/2024    HCT 42.2 05/18/2024    MCV 88 05/18/2024     05/18/2024      Lab Results   Component Value Date    SODIUM 139 05/18/2024    K 4.0 05/18/2024     05/18/2024    CO2 26 05/18/2024    BUN 13 05/18/2024    CREATININE 0.69 05/18/2024    GLUC 114 (H) 09/16/2023    CALCIUM 9.2 05/18/2024      Lab Results   Component Value Date    HGBA1C 5.5 05/18/2024      No results found for: \"CHOL\"  Lab Results   Component Value Date    HDL 68 05/18/2024     Lab Results   Component Value Date    LDLCALC 88 05/18/2024     Lab Results   Component Value Date    TRIG 40 05/18/2024     No results found for: \"CHOLHDL\"   No results found for: \"INR\", \"PROTIME\"       Patient Active Problem List    Diagnosis Date Noted   • Adult general medical examination 05/13/2024   • Anogenital human papilloma virus (HPV) infection 12/06/2023   • Palpitations 10/06/2022   • Chronic daily headache 10/04/2022   • Seizure-like activity (HCC) 09/16/2022   • Syncope and collapse 09/16/2022   • Herpes 11/11/2021   • PTSD (post-traumatic " "stress disorder) 11/11/2021   • Polyarthralgia 06/02/2021   • Anxiety 07/13/2020   • Eating disorder 11/01/2018   • History of suicidal ideation 11/01/2018   • Asthma 06/01/2018   • Mood disorder of depressed type 06/01/2018   • On Depo-Provera for contraception 10/06/2017   • Alcohol dependence, episodic drinking behavior (HCC) 04/12/2017   • Recurrent major depressive disorder, in partial remission (HCC) 11/03/2014       Portions of the record may have been created with voice recognition software. Occasional wrong word or \"sound a like\" substitutions may have occurred due to the inherent limitations of voice recognition software. Read the chart carefully and recognize, using context, where substitutions have occurred.    Junaid Puentes DO, [facility]  5/20/2024 1:11 PM          "

## 2024-06-05 ENCOUNTER — ANESTHESIA EVENT (EMERGENCY)
Dept: PERIOP | Facility: HOSPITAL | Age: 34
End: 2024-06-05
Payer: COMMERCIAL

## 2024-06-05 ENCOUNTER — ANESTHESIA (EMERGENCY)
Dept: PERIOP | Facility: HOSPITAL | Age: 34
End: 2024-06-05
Payer: COMMERCIAL

## 2024-06-05 ENCOUNTER — APPOINTMENT (EMERGENCY)
Dept: CT IMAGING | Facility: HOSPITAL | Age: 34
End: 2024-06-05
Payer: COMMERCIAL

## 2024-06-05 ENCOUNTER — HOSPITAL ENCOUNTER (OUTPATIENT)
Facility: HOSPITAL | Age: 34
Setting detail: OUTPATIENT SURGERY
Discharge: HOME | End: 2024-06-05
Attending: EMERGENCY MEDICINE | Admitting: SURGERY
Payer: COMMERCIAL

## 2024-06-05 ENCOUNTER — AMB VIDEO VISIT (OUTPATIENT)
Dept: OTHER | Facility: HOSPITAL | Age: 34
End: 2024-06-05

## 2024-06-05 VITALS
RESPIRATION RATE: 18 BRPM | HEART RATE: 71 BPM | OXYGEN SATURATION: 99 % | WEIGHT: 125.44 LBS | DIASTOLIC BLOOD PRESSURE: 66 MMHG | TEMPERATURE: 98.3 F | BODY MASS INDEX: 22.58 KG/M2 | SYSTOLIC BLOOD PRESSURE: 107 MMHG

## 2024-06-05 DIAGNOSIS — K37 APPENDICITIS: Primary | ICD-10-CM

## 2024-06-05 DIAGNOSIS — R10.84 GENERALIZED ABDOMINAL PAIN: Primary | ICD-10-CM

## 2024-06-05 PROBLEM — K35.30 ACUTE APPENDICITIS WITH LOCALIZED PERITONITIS, WITHOUT PERFORATION, ABSCESS, OR GANGRENE: Status: ACTIVE | Noted: 2024-06-05

## 2024-06-05 LAB
ALBUMIN SERPL BCP-MCNC: 4.4 G/DL (ref 3.5–5)
ALP SERPL-CCNC: 59 U/L (ref 34–104)
ALT SERPL W P-5'-P-CCNC: 13 U/L (ref 7–52)
ANION GAP SERPL CALCULATED.3IONS-SCNC: 7 MMOL/L (ref 4–13)
AST SERPL W P-5'-P-CCNC: 16 U/L (ref 13–39)
BACTERIA UR QL AUTO: ABNORMAL /HPF
BASOPHILS # BLD AUTO: 0.04 THOUSANDS/ÂΜL (ref 0–0.1)
BASOPHILS NFR BLD AUTO: 0 % (ref 0–1)
BILIRUB DIRECT SERPL-MCNC: 0.14 MG/DL (ref 0–0.2)
BILIRUB SERPL-MCNC: 0.64 MG/DL (ref 0.2–1)
BILIRUB UR QL STRIP: NEGATIVE
BUN SERPL-MCNC: 14 MG/DL (ref 5–25)
CALCIUM SERPL-MCNC: 9.3 MG/DL (ref 8.4–10.2)
CHLORIDE SERPL-SCNC: 106 MMOL/L (ref 96–108)
CLARITY UR: CLEAR
CO2 SERPL-SCNC: 25 MMOL/L (ref 21–32)
COLOR UR: YELLOW
CREAT SERPL-MCNC: 0.76 MG/DL (ref 0.6–1.3)
EOSINOPHIL # BLD AUTO: 0.03 THOUSAND/ÂΜL (ref 0–0.61)
EOSINOPHIL NFR BLD AUTO: 0 % (ref 0–6)
ERYTHROCYTE [DISTWIDTH] IN BLOOD BY AUTOMATED COUNT: 12.1 % (ref 11.6–15.1)
EXT PREGNANCY TEST URINE: NEGATIVE
EXT. CONTROL: NORMAL
GFR SERPL CREATININE-BSD FRML MDRD: 104 ML/MIN/1.73SQ M
GLUCOSE SERPL-MCNC: 100 MG/DL (ref 65–140)
GLUCOSE UR STRIP-MCNC: NEGATIVE MG/DL
HCT VFR BLD AUTO: 43.8 % (ref 34.8–46.1)
HGB BLD-MCNC: 14.2 G/DL (ref 11.5–15.4)
HGB UR QL STRIP.AUTO: ABNORMAL
IMM GRANULOCYTES # BLD AUTO: 0.02 THOUSAND/UL (ref 0–0.2)
IMM GRANULOCYTES NFR BLD AUTO: 0 % (ref 0–2)
KETONES UR STRIP-MCNC: NEGATIVE MG/DL
LEUKOCYTE ESTERASE UR QL STRIP: NEGATIVE
LIPASE SERPL-CCNC: 31 U/L (ref 11–82)
LYMPHOCYTES # BLD AUTO: 1.1 THOUSANDS/ÂΜL (ref 0.6–4.47)
LYMPHOCYTES NFR BLD AUTO: 11 % (ref 14–44)
MCH RBC QN AUTO: 27.9 PG (ref 26.8–34.3)
MCHC RBC AUTO-ENTMCNC: 32.4 G/DL (ref 31.4–37.4)
MCV RBC AUTO: 86 FL (ref 82–98)
MONOCYTES # BLD AUTO: 0.63 THOUSAND/ÂΜL (ref 0.17–1.22)
MONOCYTES NFR BLD AUTO: 7 % (ref 4–12)
MUCOUS THREADS UR QL AUTO: ABNORMAL
NEUTROPHILS # BLD AUTO: 7.84 THOUSANDS/ÂΜL (ref 1.85–7.62)
NEUTS SEG NFR BLD AUTO: 82 % (ref 43–75)
NITRITE UR QL STRIP: NEGATIVE
NON-SQ EPI CELLS URNS QL MICRO: ABNORMAL /HPF
NRBC BLD AUTO-RTO: 0 /100 WBCS
PH UR STRIP.AUTO: 5.5 [PH] (ref 4.5–8)
PLATELET # BLD AUTO: 246 THOUSANDS/UL (ref 149–390)
PMV BLD AUTO: 9 FL (ref 8.9–12.7)
POTASSIUM SERPL-SCNC: 3.9 MMOL/L (ref 3.5–5.3)
PROT SERPL-MCNC: 7.3 G/DL (ref 6.4–8.4)
PROT UR STRIP-MCNC: NEGATIVE MG/DL
RBC # BLD AUTO: 5.09 MILLION/UL (ref 3.81–5.12)
RBC #/AREA URNS AUTO: ABNORMAL /HPF
SODIUM SERPL-SCNC: 138 MMOL/L (ref 135–147)
SP GR UR STRIP.AUTO: >=1.03 (ref 1–1.03)
UROBILINOGEN UR QL STRIP.AUTO: 0.2 E.U./DL
WBC # BLD AUTO: 9.66 THOUSAND/UL (ref 4.31–10.16)
WBC #/AREA URNS AUTO: ABNORMAL /HPF

## 2024-06-05 PROCEDURE — 81001 URINALYSIS AUTO W/SCOPE: CPT

## 2024-06-05 PROCEDURE — 99285 EMERGENCY DEPT VISIT HI MDM: CPT

## 2024-06-05 PROCEDURE — 80076 HEPATIC FUNCTION PANEL: CPT

## 2024-06-05 PROCEDURE — 44970 LAPAROSCOPY APPENDECTOMY: CPT | Performed by: PHYSICIAN ASSISTANT

## 2024-06-05 PROCEDURE — 96375 TX/PRO/DX INJ NEW DRUG ADDON: CPT

## 2024-06-05 PROCEDURE — 88304 TISSUE EXAM BY PATHOLOGIST: CPT | Performed by: SPECIALIST

## 2024-06-05 PROCEDURE — 80048 BASIC METABOLIC PNL TOTAL CA: CPT

## 2024-06-05 PROCEDURE — 96365 THER/PROPH/DIAG IV INF INIT: CPT

## 2024-06-05 PROCEDURE — 74177 CT ABD & PELVIS W/CONTRAST: CPT

## 2024-06-05 PROCEDURE — 96367 TX/PROPH/DG ADDL SEQ IV INF: CPT

## 2024-06-05 PROCEDURE — 83690 ASSAY OF LIPASE: CPT

## 2024-06-05 PROCEDURE — 99222 1ST HOSP IP/OBS MODERATE 55: CPT | Performed by: SURGERY

## 2024-06-05 PROCEDURE — 99284 EMERGENCY DEPT VISIT MOD MDM: CPT

## 2024-06-05 PROCEDURE — 85025 COMPLETE CBC W/AUTO DIFF WBC: CPT

## 2024-06-05 PROCEDURE — 96366 THER/PROPH/DIAG IV INF ADDON: CPT

## 2024-06-05 PROCEDURE — 36415 COLL VENOUS BLD VENIPUNCTURE: CPT

## 2024-06-05 PROCEDURE — 44970 LAPAROSCOPY APPENDECTOMY: CPT | Performed by: SURGERY

## 2024-06-05 PROCEDURE — 81025 URINE PREGNANCY TEST: CPT

## 2024-06-05 RX ORDER — ONDANSETRON 2 MG/ML
INJECTION INTRAMUSCULAR; INTRAVENOUS AS NEEDED
Status: DISCONTINUED | OUTPATIENT
Start: 2024-06-05 | End: 2024-06-05

## 2024-06-05 RX ORDER — CEFAZOLIN SODIUM 1 G/3ML
INJECTION, POWDER, FOR SOLUTION INTRAMUSCULAR; INTRAVENOUS AS NEEDED
Status: DISCONTINUED | OUTPATIENT
Start: 2024-06-05 | End: 2024-06-05

## 2024-06-05 RX ORDER — METOCLOPRAMIDE HYDROCHLORIDE 5 MG/ML
10 INJECTION INTRAMUSCULAR; INTRAVENOUS ONCE
Status: COMPLETED | OUTPATIENT
Start: 2024-06-05 | End: 2024-06-05

## 2024-06-05 RX ORDER — HYDROMORPHONE HCL IN WATER/PF 6 MG/30 ML
0.2 PATIENT CONTROLLED ANALGESIA SYRINGE INTRAVENOUS
Status: DISCONTINUED | OUTPATIENT
Start: 2024-06-05 | End: 2024-06-05

## 2024-06-05 RX ORDER — BUPIVACAINE HYDROCHLORIDE 5 MG/ML
INJECTION, SOLUTION EPIDURAL; INTRACAUDAL AS NEEDED
Status: DISCONTINUED | OUTPATIENT
Start: 2024-06-05 | End: 2024-06-05 | Stop reason: HOSPADM

## 2024-06-05 RX ORDER — HYDROMORPHONE HCL/PF 1 MG/ML
0.5 SYRINGE (ML) INJECTION
Status: DISCONTINUED | OUTPATIENT
Start: 2024-06-05 | End: 2024-06-05

## 2024-06-05 RX ORDER — ONDANSETRON 2 MG/ML
4 INJECTION INTRAMUSCULAR; INTRAVENOUS ONCE AS NEEDED
Status: DISCONTINUED | OUTPATIENT
Start: 2024-06-05 | End: 2024-06-05 | Stop reason: HOSPADM

## 2024-06-05 RX ORDER — GLYCOPYRROLATE 0.2 MG/ML
INJECTION INTRAMUSCULAR; INTRAVENOUS AS NEEDED
Status: DISCONTINUED | OUTPATIENT
Start: 2024-06-05 | End: 2024-06-05

## 2024-06-05 RX ORDER — MIDAZOLAM HYDROCHLORIDE 2 MG/2ML
INJECTION, SOLUTION INTRAMUSCULAR; INTRAVENOUS CONTINUOUS PRN
Status: DISCONTINUED | OUTPATIENT
Start: 2024-06-05 | End: 2024-06-05

## 2024-06-05 RX ORDER — ONDANSETRON 2 MG/ML
4 INJECTION INTRAMUSCULAR; INTRAVENOUS EVERY 4 HOURS PRN
Status: DISCONTINUED | OUTPATIENT
Start: 2024-06-05 | End: 2024-06-05 | Stop reason: HOSPADM

## 2024-06-05 RX ORDER — SODIUM CHLORIDE 9 MG/ML
100 INJECTION, SOLUTION INTRAVENOUS CONTINUOUS
Status: DISCONTINUED | OUTPATIENT
Start: 2024-06-05 | End: 2024-06-05 | Stop reason: HOSPADM

## 2024-06-05 RX ORDER — KETOROLAC TROMETHAMINE 30 MG/ML
15 INJECTION, SOLUTION INTRAMUSCULAR; INTRAVENOUS ONCE
Status: DISCONTINUED | OUTPATIENT
Start: 2024-06-05 | End: 2024-06-05 | Stop reason: HOSPADM

## 2024-06-05 RX ORDER — ROCURONIUM BROMIDE 10 MG/ML
INJECTION, SOLUTION INTRAVENOUS AS NEEDED
Status: DISCONTINUED | OUTPATIENT
Start: 2024-06-05 | End: 2024-06-05

## 2024-06-05 RX ORDER — LIDOCAINE HYDROCHLORIDE 20 MG/ML
INJECTION, SOLUTION EPIDURAL; INFILTRATION; INTRACAUDAL; PERINEURAL AS NEEDED
Status: DISCONTINUED | OUTPATIENT
Start: 2024-06-05 | End: 2024-06-05

## 2024-06-05 RX ORDER — METRONIDAZOLE 500 MG/100ML
500 INJECTION, SOLUTION INTRAVENOUS EVERY 8 HOURS
Status: DISCONTINUED | OUTPATIENT
Start: 2024-06-05 | End: 2024-06-05 | Stop reason: HOSPADM

## 2024-06-05 RX ORDER — ACETAMINOPHEN 10 MG/ML
1000 INJECTION, SOLUTION INTRAVENOUS ONCE
Status: COMPLETED | OUTPATIENT
Start: 2024-06-05 | End: 2024-06-05

## 2024-06-05 RX ORDER — FENTANYL CITRATE 50 UG/ML
INJECTION, SOLUTION INTRAMUSCULAR; INTRAVENOUS CONTINUOUS PRN
Status: DISCONTINUED | OUTPATIENT
Start: 2024-06-05 | End: 2024-06-05

## 2024-06-05 RX ORDER — ONDANSETRON 2 MG/ML
4 INJECTION INTRAMUSCULAR; INTRAVENOUS ONCE
Status: COMPLETED | OUTPATIENT
Start: 2024-06-05 | End: 2024-06-05

## 2024-06-05 RX ORDER — DEXAMETHASONE SODIUM PHOSPHATE 10 MG/ML
INJECTION, SOLUTION INTRAMUSCULAR; INTRAVENOUS AS NEEDED
Status: DISCONTINUED | OUTPATIENT
Start: 2024-06-05 | End: 2024-06-05

## 2024-06-05 RX ORDER — PROPOFOL 10 MG/ML
INJECTION, EMULSION INTRAVENOUS AS NEEDED
Status: DISCONTINUED | OUTPATIENT
Start: 2024-06-05 | End: 2024-06-05

## 2024-06-05 RX ORDER — ACETAMINOPHEN 325 MG/1
650 TABLET ORAL EVERY 6 HOURS PRN
Status: DISCONTINUED | OUTPATIENT
Start: 2024-06-05 | End: 2024-06-05 | Stop reason: HOSPADM

## 2024-06-05 RX ORDER — SUCCINYLCHOLINE/SOD CL,ISO/PF 100 MG/5ML
SYRINGE (ML) INTRAVENOUS AS NEEDED
Status: DISCONTINUED | OUTPATIENT
Start: 2024-06-05 | End: 2024-06-05

## 2024-06-05 RX ADMIN — SODIUM CHLORIDE 100 ML/HR: 0.9 INJECTION, SOLUTION INTRAVENOUS at 14:19

## 2024-06-05 RX ADMIN — CEFTRIAXONE 1000 MG: 1 INJECTION, POWDER, FOR SOLUTION INTRAMUSCULAR; INTRAVENOUS at 08:39

## 2024-06-05 RX ADMIN — ONDANSETRON 4 MG: 2 INJECTION INTRAMUSCULAR; INTRAVENOUS at 11:26

## 2024-06-05 RX ADMIN — METRONIDAZOLE 500 MG: 500 SOLUTION INTRAVENOUS at 09:14

## 2024-06-05 RX ADMIN — LIDOCAINE HYDROCHLORIDE 80 MG: 20 INJECTION, SOLUTION EPIDURAL; INFILTRATION; INTRACAUDAL at 10:23

## 2024-06-05 RX ADMIN — ROCURONIUM BROMIDE 40 MG: 10 INJECTION, SOLUTION INTRAVENOUS at 10:26

## 2024-06-05 RX ADMIN — GLYCOPYRROLATE 0.2 MG: 0.2 INJECTION, SOLUTION INTRAMUSCULAR; INTRAVENOUS at 11:56

## 2024-06-05 RX ADMIN — ACETAMINOPHEN 1000 MG: 10 INJECTION INTRAVENOUS at 10:13

## 2024-06-05 RX ADMIN — ONDANSETRON 4 MG: 2 INJECTION INTRAMUSCULAR; INTRAVENOUS at 11:01

## 2024-06-05 RX ADMIN — METOCLOPRAMIDE 10 MG: 5 INJECTION, SOLUTION INTRAMUSCULAR; INTRAVENOUS at 11:55

## 2024-06-05 RX ADMIN — ACETAMINOPHEN 325MG 650 MG: 325 TABLET ORAL at 16:47

## 2024-06-05 RX ADMIN — ONDANSETRON 4 MG: 2 INJECTION INTRAMUSCULAR; INTRAVENOUS at 06:51

## 2024-06-05 RX ADMIN — ONDANSETRON 4 MG: 2 INJECTION INTRAMUSCULAR; INTRAVENOUS at 13:05

## 2024-06-05 RX ADMIN — SUGAMMADEX 230 MG: 100 INJECTION, SOLUTION INTRAVENOUS at 11:11

## 2024-06-05 RX ADMIN — SODIUM CHLORIDE, SODIUM LACTATE, POTASSIUM CHLORIDE, AND CALCIUM CHLORIDE 1000 ML: .6; .31; .03; .02 INJECTION, SOLUTION INTRAVENOUS at 06:42

## 2024-06-05 RX ADMIN — Medication 100 MG: at 10:23

## 2024-06-05 RX ADMIN — PROPOFOL 250 MG: 10 INJECTION, EMULSION INTRAVENOUS at 10:23

## 2024-06-05 RX ADMIN — IOHEXOL 85 ML: 350 INJECTION, SOLUTION INTRAVENOUS at 07:34

## 2024-06-05 RX ADMIN — SODIUM CHLORIDE 12 MCG: 9 INJECTION, SOLUTION INTRAVENOUS at 10:45

## 2024-06-05 RX ADMIN — SODIUM CHLORIDE 12 MCG: 9 INJECTION, SOLUTION INTRAVENOUS at 10:38

## 2024-06-05 RX ADMIN — SODIUM CHLORIDE: 0.9 INJECTION, SOLUTION INTRAVENOUS at 10:13

## 2024-06-05 RX ADMIN — DEXAMETHASONE SODIUM PHOSPHATE 10 MG: 10 INJECTION INTRAMUSCULAR; INTRAVENOUS at 10:24

## 2024-06-05 RX ADMIN — METRONIDAZOLE 500 MG: 500 SOLUTION INTRAVENOUS at 16:44

## 2024-06-05 RX ADMIN — CEFAZOLIN 1000 MG: 1 INJECTION, POWDER, FOR SOLUTION INTRAMUSCULAR; INTRAVENOUS; PARENTERAL at 10:33

## 2024-06-05 NOTE — PLAN OF CARE
Problem: PAIN - ADULT  Goal: Verbalizes/displays adequate comfort level or baseline comfort level  Description: Interventions:  - Encourage patient to monitor pain and request assistance  - Assess pain using appropriate pain scale  - Administer analgesics based on type and severity of pain and evaluate response  - Implement non-pharmacological measures as appropriate and evaluate response  - Consider cultural and social influences on pain and pain management  - Notify physician/advanced practitioner if interventions unsuccessful or patient reports new pain  Outcome: Progressing     Problem: INFECTION - ADULT  Goal: Absence or prevention of progression during hospitalization  Description: INTERVENTIONS:  - Assess and monitor for signs and symptoms of infection  - Monitor lab/diagnostic results  - Monitor all insertion sites, i.e. indwelling lines, tubes, and drains  - Monitor endotracheal if appropriate and nasal secretions for changes in amount and color  - Slick appropriate cooling/warming therapies per order  - Administer medications as ordered  - Instruct and encourage patient and family to use good hand hygiene technique  - Identify and instruct in appropriate isolation precautions for identified infection/condition  Outcome: Progressing     Problem: SAFETY ADULT  Goal: Patient will remain free of falls  Description: INTERVENTIONS:  - Educate patient/family on patient safety including physical limitations  - Instruct patient to call for assistance with activity   - Consult OT/PT to assist with strengthening/mobility   - Keep Call bell within reach  - Keep bed low and locked with side rails adjusted as appropriate  - Keep care items and personal belongings within reach  - Initiate and maintain comfort rounds  - Make Fall Risk Sign visible to staff  - Offer Toileting every 2 Hours, in advance of need  - Initiate/Maintain bed alarm  - Obtain necessary fall risk management equipment:   - Apply yellow socks and  bracelet for high fall risk patients  - Consider moving patient to room near nurses station  Outcome: Progressing

## 2024-06-05 NOTE — OP NOTE
OPERATIVE REPORT  PATIENT NAME: Samia Bull    :  1990  MRN: [id number]  Pt Location: [facility] OR ROOM 06    SURGERY DATE: 2024    Surgeons and Role:     * Ignacia Vaughan MD - Primary     * Jesus Soliz PA-C - Assisting    Preop Diagnosis:  Appendicitis [K37]    Post-Op Diagnosis Codes:     * Appendicitis [K37]    Procedure(s):  APPENDECTOMY LAPAROSCOPIC    Specimen(s):  ID Type Source Tests Collected by Time Destination   1 :  Tissue Appendix TISSUE EXAM Ignacia Vaughan MD 2024 1050        Estimated Blood Loss:   Minimal    Drains:  * No LDAs found *    Anesthesia Type:   General    Operative Indications:  Appendicitis [K37]      Operative Findings:  Acute appendicitis, nonperforated      Complications:   None    Procedure and Technique:  Patient was identified in the preoperative holding area and taken back to the operating room. The patient was positioned supine on the operating room table.  General anesthesia was then induced and the patient was prepped and draped in the standard sterile surgical fashion.  Preoperative time-out was held confirming the correct patient, correct procedure and that all necessary equipment and personnel were present within the operating room. Preoperative antibiotics were administered prior to incision.  Incision was made just below the umbilicus with a scalpel and a Veress needle was inserted into the abdomen.  The abdomen was insufflated to 15 mmHg.  Veress needle was then removed and a 5 mm port placed through this incision.  Laparoscope was inserted into the port.  Intra-abdominal contents were inspected and there was no trauma from port or needle placement.  A 5 mm port was then placed in the suprapubic region and a 12 mm port in the left lower quadrant under direct visualization in similar fashion.   Atraumatic graspers were used to bluntly manipulate the bowel in the right lower quadrant, identifying the cecum and the terminal ileum.  The ileal rudder  was grasped and the appendix was identified.  The appendix was inflamed and hyperemic without evidence of perforation. The appendix was grasped with an atraumatic grasper and manipulated such that the mesoappendix was accessible.The Harmonic scalpel was used to dissect through the mesoappendix down to the appendix base.  The appendix base was free of any inflammation.  An Endo-TAMARA stapler with a white load was then used to transect the appendix at the base.  The appendix then placed into an Endo-Catch bag and removed from the abdomen.  The field was irrigated and suctioned. The staple line was examined and was intact and hemostatic.  The 12 mm port was then removed and an 0 vicryl suture on a suture closure device was used to close the fascia.  The remaining trocars were then removed and the abdomen was allowed to desufflate.  Local anesthetic was injected along each of the port sites.  4-0 Monocryl suture was then used to close the skin in subcuticular fashion.  Surgical glue was then applied.  The patient was then awoken from general anesthesia and taken to the postoperative Care Unit  in good condition.  All counts were correct at the end of the case.     I was present for the entire procedure., A qualified resident physician was not available., and A physician assistant was required during the procedure for retraction, tissue handling, dissection and suturing.    Patient Disposition:  PACU  and hemodynamically stable    This procedure was not performed to treat colon cancer through resection      SIGNATURE: Ignacia Vaughan MD  DATE: June 5, 2024  TIME: 11:04 AM

## 2024-06-05 NOTE — H&P
H&P - General Surgery   Samia Bull 34 y.o. female MRN: [id number]  Unit/Bed#: ED-28 Encounter: [id number]        Assessment/ Plan:    Acute appendicitis  Pain onset yesterday morning, worse since 2am   CT with acute appendicitis  NPO  IVF  Ceftriaxone/flagyl   OR this morning for laparoscopic appendectomy    Previous history of alcohol abuse  In remission, patient requests no narcotic pain medication  Uses PO medical marijuana at home    ____________________________________________________________________  Chief Complaint: Abdominal pain     HPI: Samia Bull is a 34 y.o. female who presents with acute onset abdominal pain yesterday morning, worsening at 2am this morning associated with nausea and multiple episodes of emesis, two episodes of diarrhea. She has not had any prior abdominal surgical history.     Review of Systems:  General: negative  Cardiovascular: no chest pain or dyspnea on exertion  Respiratory: no cough, shortness of breath, or wheezing  Gastrointestinal: positive for - abdominal pain, diarrhea, and nausea/vomiting  negative for - hematemesis or melena  Genitourinary: no dysuria, trouble voiding, or hematuria  Musculoskeletal: negative  Neurological: no TIA or stroke symptoms  Hematological and Lymphatic: negative  Dermatological: negative  Psychological: h/o etoh abuse  Ophthalmic: negative  ENT: negative    Past Medical History:  Past Medical History:   Diagnosis Date    Anxiety     Depression     Eating disorder     GERD (gastroesophageal reflux disease)     Headache(784.0)     Herpes simplex infection     Infectious viral hepatitis     Palpitations     Sciatica     Substance abuse (HCC)     Urinary tract infection        Past Surgical History:  Past Surgical History:   Procedure Laterality Date    WISDOM TOOTH EXTRACTION         Social History:  Social History     Substance and Sexual Activity   Alcohol Use No     Social History     Substance and Sexual Activity   Drug Use Yes     Types: Marijuana    Comment: Medical marijuana     Social History     Tobacco Use   Smoking Status Former    Current packs/day: 0.00    Average packs/day: 0.3 packs/day for 15.0 years (3.8 ttl pk-yrs)    Types: Cigarettes    Quit date:     Years since quittin.4   Smokeless Tobacco Never       Family History:  Family History   Problem Relation Age of Onset    Hypertension Mother     Arthritis Mother        Allergies:  Allergies   Allergen Reactions    Lactose - Food Allergy Diarrhea    Petrolatum Itching    White Petrolatum Rash       Medications:  Home meds:   Prior to Admission medications    Medication Sig Start Date End Date Taking? Authorizing Provider   medroxyPROGESTERone (Depo-Provera) 150 mg/mL injection Inject 1 mL (150 mg total) into a muscle every 3 (three) months 24   Elizabeth Shoemaker PA-C   valACYclovir (VALTREX) 500 mg tablet Take 500 mg by mouth as needed 23   Historical Provider, MD         Vitals:  /73 (BP Location: Right arm)   Pulse 66   Temp 97.7 °F (36.5 °C) (Oral)   Resp 18   Wt 56.9 kg (125 lb 7.1 oz)   SpO2 98%   BMI 22.58 kg/m²   Body mass index is 22.58 kg/m².  Weight (last 2 days)       Date/Time Weight    24 0621 56.9 (125.44)            I/Os:    Intake/Output Summary (Last 24 hours) at 2024 0935  Last data filed at 2024 0835  Gross per 24 hour   Intake 1000 ml   Output --   Net 1000 ml       Physical Exam  Constitutional:       Appearance: Normal appearance.   HENT:      Head: Normocephalic and atraumatic.   Eyes:      Conjunctiva/sclera: Conjunctivae normal.   Cardiovascular:      Rate and Rhythm: Normal rate and regular rhythm.      Pulses: Normal pulses.      Heart sounds: Normal heart sounds.   Pulmonary:      Effort: Pulmonary effort is normal.      Breath sounds: Normal breath sounds.   Abdominal:      General: Abdomen is flat. There is no distension.      Palpations: Abdomen is soft. There is no mass.      Tenderness: There is  "abdominal tenderness (b/l lower quadrants R>L). There is guarding (RLQ).   Musculoskeletal:      Cervical back: Neck supple.   Skin:     General: Skin is warm and dry.   Neurological:      Mental Status: She is alert and oriented to person, place, and time.   Psychiatric:         Mood and Affect: Mood normal.         Behavior: Behavior normal.              Lab Results and Cultures:   CBC with diff:   Lab Results   Component Value Date    WBC 9.66 2024    HGB 14.2 2024    HCT 43.8 2024    MCV 86 2024     2024    RBC 5.09 2024    MCH 27.9 2024    MCHC 32.4 2024    RDW 12.1 2024    MPV 9.0 2024    NRBC 0 2024      BMP/CMP:  Lab Results   Component Value Date    K 3.9 2024    K 4.1 2023     2024     2023    CO2 25 2024    CO2 29 2023    BUN 14 2024    BUN 18 2023    CREATININE 0.76 2024    CREATININE 0.88 2023    CALCIUM 9.3 2024    CALCIUM 9.4 2023    AST 16 2024    AST 18 2023    ALT 13 2024    ALT 21 2023    ALKPHOS 59 2024    ALKPHOS 52 2023    EGFR 104 2024    EGFR 90 2023   ,   Lipid Panel: No results found for: \"CHOL\"  Lab Results   Component Value Date    HDL 68 2024     Lab Results   Component Value Date    HDL 68 2024     Lab Results   Component Value Date    LDLCALC 88 2024     Lab Results   Component Value Date    TRIG 40 2024       HgbA1c:   Lab Results   Component Value Date    HGBA1C 5.5 2024         Urinalysis:   Lab Results   Component Value Date    COLORU Yellow 2024    CLARITYU Clear 2024    SPECGRAV >=1.030 2024    PHUR 5.5 2024    LEUKOCYTESUR Negative 2024    NITRITE Negative 2024    GLUCOSEU Negative 2024    KETONESU Negative 2024    BILIRUBINUR Negative 2024    BLOODU Small (A) 2024   ,     Imagin/5 CT " AP: Acute appendicitis       VTE Prophylaxis: Reason for no pharmacologic prophylaxis low risk     Code Status: No Order      Jesus Soliz PA-C  6/5/2024

## 2024-06-05 NOTE — ANESTHESIA PREPROCEDURE EVALUATION
Procedure:  APPENDECTOMY LAPAROSCOPIC (Abdomen)    Relevant Problems   NEURO/PSYCH   (+) Anxiety   (+) Chronic daily headache   (+) Mood disorder of depressed type   (+) PTSD (post-traumatic stress disorder)   (+) Recurrent major depressive disorder, in partial remission (HCC)      PULMONARY   (+) Asthma      (+) nausea    (+) MJ use    Physical Exam    Airway    Mallampati score: II  TM Distance: >3 FB  Neck ROM: full     Dental       Cardiovascular  Rhythm: regular    Pulmonary   Breath sounds clear to auscultation    Other Findings  post-pubertal.      Anesthesia Plan  ASA Score- 2     Anesthesia Type- general with ASA Monitors.         Additional Monitors:     Airway Plan: ETT.    Comment: Pt prefers no narcotics..       Plan Factors-Exercise tolerance (METS): >4 METS.    Chart reviewed.   Existing labs reviewed.     Patient is a current smoker.              Induction- intravenous.    Postoperative Plan- Plan for postoperative opioid use. Planned trial extubation        Informed Consent- Anesthetic plan and risks discussed with patient.

## 2024-06-05 NOTE — NURSING NOTE
Patient is permitted to go home by surgical resident on duty, after informing that she has not passed gas. But is burping.  She ate a whole meal, tolerating.  Ambulating and resumed normal voiding.

## 2024-06-05 NOTE — ANESTHESIA POSTPROCEDURE EVALUATION
Post-Op Assessment Note    CV Status:  Stable    Pain management: adequate       Mental Status:  Alert and awake   Hydration Status:  Euvolemic   PONV Controlled:  Controlled   Airway Patency:  Patent     Post Op Vitals Reviewed: Yes    No anethesia notable event occurred.    Staff: Anesthesiologist               BP      Temp      Pulse     Resp      SpO2      /67 (BP Location: Left arm)   Pulse 64   Temp 97.6 °F (36.4 °C) (Temporal)   Resp 20   Wt 56.9 kg (125 lb 7.1 oz)   SpO2 97%   BMI 22.58 kg/m²

## 2024-06-05 NOTE — ED PROVIDER NOTES
History  Chief Complaint   Patient presents with    Abdominal Pain     Pt reports generalized abdominal pain with N/V since yesterday afternoon. Unable to tolerate PO intake. Pt actively vomiting in triage.     34 YOF with PMH anxiety, depression, GERD presents today with abd pain, nausea, vomiting and diarrhea. Pt reports yesterday she started to experience abdominal pain and felt constipated. Reports this morning she woke up and had 1 episode of diarrhea. Pt reports she drank her pre workout shake and ended up vomiting that up. Pt reports she tried walking on her walking pad at home but became light headed after about 15 minutes. Pt states she then tried to drink her electrolyte drink and vomited that as well. Pt denies blood in her stool or emesis. No fevers. No recent travel outside the country. Pt states her last period was in 2014 because she is on the Depo shot.         Prior to Admission Medications   Prescriptions Last Dose Informant Patient Reported? Taking?   medroxyPROGESTERone (Depo-Provera) 150 mg/mL injection   No No   Sig: Inject 1 mL (150 mg total) into a muscle every 3 (three) months   valACYclovir (VALTREX) 500 mg tablet   Yes No   Sig: Take 500 mg by mouth as needed      Facility-Administered Medications: None       Past Medical History:   Diagnosis Date    Anxiety     Depression     Eating disorder     GERD (gastroesophageal reflux disease)     Headache(784.0)     Herpes simplex infection     Infectious viral hepatitis     Palpitations     Sciatica     Substance abuse (HCC)     Urinary tract infection        Past Surgical History:   Procedure Laterality Date    WISDOM TOOTH EXTRACTION         Family History   Problem Relation Age of Onset    Hypertension Mother     Arthritis Mother      I have reviewed and agree with the history as documented.    E-Cigarette/Vaping    E-Cigarette Use Current Every Day User      E-Cigarette/Vaping Substances    Nicotine No     THC Yes     CBD Yes     Flavoring No      Other No     Unknown No      Social History     Tobacco Use    Smoking status: Former     Current packs/day: 0.00     Average packs/day: 0.3 packs/day for 15.0 years (3.8 ttl pk-yrs)     Types: Cigarettes     Quit date:      Years since quittin.4    Smokeless tobacco: Never   Vaping Use    Vaping status: Every Day    Substances: THC, CBD   Substance Use Topics    Alcohol use: No    Drug use: Yes     Types: Marijuana     Comment: Medical marijuana       Review of Systems   Constitutional:  Negative for chills and fever.   Gastrointestinal:  Positive for abdominal pain, diarrhea, nausea and vomiting. Negative for blood in stool.   Neurological:  Positive for light-headedness.       Physical Exam  Physical Exam  Vitals and nursing note reviewed.   Constitutional:       General: She is not in acute distress.     Appearance: She is well-developed. She is not ill-appearing.   HENT:      Head: Normocephalic and atraumatic.   Eyes:      Conjunctiva/sclera: Conjunctivae normal.   Cardiovascular:      Rate and Rhythm: Normal rate and regular rhythm.      Heart sounds: No murmur heard.  Pulmonary:      Effort: Pulmonary effort is normal. No respiratory distress.      Breath sounds: Normal breath sounds.   Abdominal:      General: Abdomen is flat.      Palpations: Abdomen is soft.      Tenderness: There is generalized abdominal tenderness.   Musculoskeletal:         General: No swelling.      Cervical back: Neck supple.   Skin:     General: Skin is warm and dry.      Capillary Refill: Capillary refill takes less than 2 seconds.   Neurological:      Mental Status: She is alert.   Psychiatric:         Mood and Affect: Mood normal.         Vital Signs  ED Triage Vitals   Temperature Pulse Respirations Blood Pressure SpO2   24 0621 24 0621 24 0621 24 0621 24 0621   97.7 °F (36.5 °C) 64 18 119/63 99 %      Temp Source Heart Rate Source Patient Position - Orthostatic VS BP Location FiO2 (%)    06/05/24 0621 06/05/24 0621 06/05/24 0621 06/05/24 0621 --   Oral Monitor Sitting Right arm       Pain Score       06/05/24 0651       8           Vitals:    06/05/24 0621 06/05/24 0848   BP: 119/63 109/73   Pulse: 64 66   Patient Position - Orthostatic VS: Sitting Lying         Visual Acuity      ED Medications  Medications   ketorolac (TORADOL) injection 15 mg (15 mg Intravenous Not Given 6/5/24 0651)   ceftriaxone (ROCEPHIN) 1 g/50 mL in dextrose IVPB (1,000 mg Intravenous New Bag 6/5/24 0839)   metroNIDAZOLE (FLAGYL) IVPB (premix) 500 mg 100 mL (has no administration in time range)   HYDROmorphone HCl (DILAUDID) injection 0.2 mg (has no administration in time range)   HYDROmorphone (DILAUDID) injection 0.5 mg (has no administration in time range)   acetaminophen (TYLENOL) tablet 650 mg (has no administration in time range)   ondansetron (ZOFRAN) injection 4 mg (has no administration in time range)   lactated ringers bolus 1,000 mL (0 mL Intravenous Stopped 6/5/24 0835)   ondansetron (ZOFRAN) injection 4 mg (4 mg Intravenous Given 6/5/24 0651)   iohexol (OMNIPAQUE) 350 MG/ML injection (MULTI-DOSE) 85 mL (85 mL Intravenous Given 6/5/24 0734)       Diagnostic Studies  Results Reviewed       Procedure Component Value Units Date/Time    Urine Microscopic [[id number]]  (Abnormal) Collected: 06/05/24 0648    Lab Status: Final result Specimen: Urine, Clean Catch Updated: 06/05/24 0715     RBC, UA 1-2 /hpf      WBC, UA None Seen /hpf      Epithelial Cells Occasional /hpf      Bacteria, UA None Seen /hpf      MUCUS THREADS Occasional    Lipase [[id number]]  (Normal) Collected: 06/05/24 0642    Lab Status: Final result Specimen: Blood from Hand, Left Updated: 06/05/24 0713     Lipase 31 u/L     Basic metabolic panel [[id number]] Collected: 06/05/24 0642    Lab Status: Final result Specimen: Blood from Hand, Left Updated: 06/05/24 0713     Sodium 138 mmol/L      Potassium 3.9 mmol/L      Chloride 106 mmol/L      CO2 25  mmol/L      ANION GAP 7 mmol/L      BUN 14 mg/dL      Creatinine 0.76 mg/dL      Glucose 100 mg/dL      Calcium 9.3 mg/dL      eGFR 104 ml/min/1.73sq m     Narrative:      National Kidney Disease Foundation guidelines for Chronic Kidney Disease (CKD):     Stage 1 with normal or high GFR (GFR > 90 mL/min/1.73 square meters)    Stage 2 Mild CKD (GFR = 60-89 mL/min/1.73 square meters)    Stage 3A Moderate CKD (GFR = 45-59 mL/min/1.73 square meters)    Stage 3B Moderate CKD (GFR = 30-44 mL/min/1.73 square meters)    Stage 4 Severe CKD (GFR = 15-29 mL/min/1.73 square meters)    Stage 5 End Stage CKD (GFR <15 mL/min/1.73 square meters)  Note: GFR calculation is accurate only with a steady state creatinine    Hepatic function panel [[id number]]  (Normal) Collected: 06/05/24 0642    Lab Status: Final result Specimen: Blood from Hand, Left Updated: 06/05/24 0713     Total Bilirubin 0.64 mg/dL      Bilirubin, Direct 0.14 mg/dL      Alkaline Phosphatase 59 U/L      AST 16 U/L      ALT 13 U/L      Total Protein 7.3 g/dL      Albumin 4.4 g/dL     CBC and differential [[id number]]  (Abnormal) Collected: 06/05/24 0642    Lab Status: Final result Specimen: Blood from Arm, Left Updated: 06/05/24 0652     WBC 9.66 Thousand/uL      RBC 5.09 Million/uL      Hemoglobin 14.2 g/dL      Hematocrit 43.8 %      MCV 86 fL      MCH 27.9 pg      MCHC 32.4 g/dL      RDW 12.1 %      MPV 9.0 fL      Platelets 246 Thousands/uL      nRBC 0 /100 WBCs      Segmented % 82 %      Immature Grans % 0 %      Lymphocytes % 11 %      Monocytes % 7 %      Eosinophils Relative 0 %      Basophils Relative 0 %      Absolute Neutrophils 7.84 Thousands/µL      Absolute Immature Grans 0.02 Thousand/uL      Absolute Lymphocytes 1.10 Thousands/µL      Absolute Monocytes 0.63 Thousand/µL      Eosinophils Absolute 0.03 Thousand/µL      Basophils Absolute 0.04 Thousands/µL     POCT pregnancy, urine [[id number]]  (Normal) Resulted: 06/05/24 0649    Lab Status: Final result  Updated: 06/05/24 0651     EXT Preg Test, Ur Negative     Control Valid    Urine Macroscopic, POC [[id number]]  (Abnormal) Collected: 06/05/24 0648    Lab Status: Final result Specimen: Urine Updated: 06/05/24 0649     Color, UA Yellow     Clarity, UA Clear     pH, UA 5.5     Leukocytes, UA Negative     Nitrite, UA Negative     Protein, UA Negative mg/dl      Glucose, UA Negative mg/dl      Ketones, UA Negative mg/dl      Urobilinogen, UA 0.2 E.U./dl      Bilirubin, UA Negative     Occult Blood, UA Small     Specific Gravity, UA >=1.030    Narrative:      [facility] RESULT                   CT abdomen pelvis w contrast   Final Result by Matt Thao MD (06/05 0752)      Acute appendicitis without evidence of perforation.         I personally discussed this study with RAFAL DOCKERY on 6/5/2024 7:52 AM.               Workstation performed: [id number]                    Procedures  Procedures         ED Course  ED Course as of 06/05/24 0904   Wed Jun 05, 2024   0751 Spoke with radiology: acute appendicitis    0755 Discussed results with the pt- pt is very upset and scared about the procedure. She is also reporting pain still. I offered pain medication but states that she is in recovery and does not want anything for the pain    0759 Epic secure chat to [facility] surgery resident- informed by them they are not in the role and is not sure how to change it, they will forward to the [facility] team    0810 Message sent to [facility]    0850 Message again sent to surgery resident role on secure chat- still the same resident is on, they report they did forward the message. Will TT at this time    0901 Message sent to surgery attending Dr. Vaughan   0902 Appears the pt is admitted                                SBIRT 22yo+      Flowsheet Row Most Recent Value   Initial Alcohol Screen: US AUDIT-C     1. How often do you have a drink containing alcohol? 0 Filed at: 06/05/2024 0758   2. How many drinks containing alcohol do you  have on a typical day you are drinking?  0 Filed at: 06/05/2024 0758   3b. FEMALE Any Age, or MALE 65+: How often do you have 4 or more drinks on one occassion? 0 Filed at: 06/05/2024 0758   Audit-C Score 0 Filed at: 06/05/2024 0758   ERNESTO: How many times in the past year have you...    Used an illegal drug or used a prescription medication for non-medical reasons? Never Filed at: 06/05/2024 0758                      Medical Decision Making  Pt has acute appendicitis. Will be going to OR for appendectomy     Amount and/or Complexity of Data Reviewed  Labs: ordered.  Radiology: ordered.    Risk  Prescription drug management.  Decision regarding hospitalization.  Elective major surgery with identified risk factors.             Disposition  Final diagnoses:   Appendicitis     Time reflects when diagnosis was documented in both MDM as applicable and the Disposition within this note       Time User Action Codes Description Comment    6/5/2024  7:53 AM Magno Ochoa Add [K37] Appendicitis           ED Disposition       ED Disposition   Admit    Condition   Stable    Date/Time   Wed Jun 5, 2024 0903    Comment   Case was discussed with surgery and they will be admitting pt                Follow-up Information    None         Patient's Medications   Discharge Prescriptions    No medications on file       No discharge procedures on file.    PDMP Review       None            ED Provider  Electronically Signed by             Magno Ochoa PA-C  06/05/24 0904

## 2024-06-05 NOTE — DISCHARGE INSTR - AVS FIRST PAGE
[facility] Discharge Instructions      1. General: You will feel pulling sensations around the wound or funny aches and pains around the incisions. You may have some bruising or mild redness. This is normal. Even minor surgery is a change in your body and this is your body’s reaction to it. If you have had abdominal surgery, it may help to support the incision with a small pillow or blanket for comfort when moving or coughing. There may be some hardness surrounding your incision which is your body's normal reaction to surgery. This typically softens up over time. You may also experience itching as the incision heals. A heating pad or ice pack can also be beneficial in the post-op period.     2. Wound care: Make sure to remove the overlying dressing/bandage in about 24 hours, unless instructed otherwise. You usually don't have to redress the wound after 24-48 hours, unless for comfort. Keep the incision clean and dry. Let air get to it. If this Steri-Strips fall off, just keep the wound clean. If there is surgical glue in place this will usually start to soften in around 2 weeks and will eventually dissolve or fall off with gentle scrubbing in the shower.    3. Water: You may shower over the wound, unless there are drain tubes left in place. Do not bathe or use a pool or hot tub until cleared by the physician. Do not swim in a lake or ocean until cleared by your physician. You may shower right over the staples or Steri-Strips and pat dry when you are done.    4. Activity: You may go up and down stairs, walk as much as you are comfortable, but walk at least 3 times each day. If you have had abdominal surgery, do not lift anything heavier than 10-15 pounds for at least 2 weeks, unless cleared by the doctor. Notes and paperwork for your job are typically filled out at your post-op appointment but if there is a time constraint please call the office for assistance if this is needed prior to your post-op  check.    5. Diet: You may resume a regular diet. If you had a same-day surgery or overnight stay surgery, you may wish to eat lightly for a few days: soups, crackers, and sandwiches. You may resume a regular diet when ready. If you have had gallbladder surgery, you should remain on a low fat diet for 2 weeks or at least until your post op appointment.     6. Medications: Resume all of your previous medications, unless told otherwise by the doctor. Ibuprofen (Advil, Motrin, etc.) is usually ok unless specifically discouraged by your surgeon. 400-600 mg of ibuprofen every 6 hours for post-surgical pain is an acceptable regimen with a maximum dosage of 2400 mg per day. Avoid ibuprofen if you are taking aspirin. If you have a bleeding disorder or have stomach issues, talk to your surgeon prior to use. Tylenol is ok, unless you are taking any narcotic pain medication containing Tylenol (such as Percocet, Darvocet, Vicodin, or anything containing acetaminophen). Be cautious taking additional Tylenol if you're taking these medications as there is a maximum dosage of 4,000 mg of Tylenol per day. You do not need to take the narcotic pain medications unless you are having significant pain and discomfort.    7. Driving: You will need someone to drive you home on the day of surgery. Do not drive or make any important decisions while on narcotic pain medication or 24 hours and after anesthesia or sedation for surgery. Generally, you may drive when you are off all narcotic pain medications.    8. Upset Stomach: You may take Maalox, Tums, or similar items for an upset stomach. If your narcotic pain medication causes an upset stomach, do not take it on an empty stomach. Try taking it with at least some crackers or toast.     9. Constipation: Patients often experience constipation after surgery due to anesthesia and pain medication. This is especially common after abdominal surgery. You may take over-the-counter medication for  this, such as Metamucil, Senokot, Dulcolax, milk of magnesia, etc. You may take a suppository unless you have had anorectal surgery such as a procedure on your hemorrhoids. If you experience significant nausea or vomiting after abdominal surgery, call the office before trying any of these medications.    10. Pain: You may be given a prescription for pain. This will be prescribed the day of surgery and sent to your pharmacy of choice. Take the pain medication as prescribed, only if you need it. Narcotic pain medications (such as anything containing hydrocodone or oxycodone) have many side effects such as nausea/vomiting, constipation, dizziness and respiratory depression. Do not drive when taking the pain medication. Do not take more than prescribed in the 4-6 hour time period.    11. Sexual Activity: You may resume sexual activity when you feel ready and comfortable and your incision is sealed and healed without apparent infection risk.    12. Urination: If you haven't urinated in 6 hours and are unable to urinate please call the office. If you are having pain and can not urinate you need to be evaluated by a physician and should go to the emergency room.     Call the office: If you are experiencing any of the following, fevers above 101.5°, significant nausea or vomiting, if the wound develops drainage and/or has excessive redness around the wound, or if you have significant diarrhea or other worsening symptoms.

## 2024-06-06 ENCOUNTER — IN-CLINIC DEVICE VISIT (OUTPATIENT)
Dept: CARDIOLOGY CLINIC | Facility: CLINIC | Age: 34
End: 2024-06-06
Payer: COMMERCIAL

## 2024-06-06 DIAGNOSIS — R55 SYNCOPE AND COLLAPSE: Primary | ICD-10-CM

## 2024-06-06 PROCEDURE — 93298 REM INTERROG DEV EVAL SCRMS: CPT | Performed by: INTERNAL MEDICINE

## 2024-06-06 NOTE — PROGRESS NOTES
"Results for orders placed or performed in visit on 06/06/24   Cardiac EP device report    Narrative    [facility] LUX-dx ICM - ACTIVE SYSTEM IS MRI CONDITIONAL  LATITUDE TRANSMISSION: LOOP: BATTERY STATUS \"OK.\" PRESENTING RHYTHM: ST @ 122 BPM. 6 CELSA EPISODES W/ AVAIL EGMS SHOWING BRADYCARDIA W/ HRS 38-41` BPM, MAX DURAITON 17 SECS. NO PATIENT ACTIVATED EPISODES. NORMAL DEVICE FUNCTION. CH        "

## 2024-06-08 ENCOUNTER — AMB VIDEO VISIT (OUTPATIENT)
Dept: OTHER | Facility: HOSPITAL | Age: 34
End: 2024-06-08
Payer: COMMERCIAL

## 2024-06-08 ENCOUNTER — NURSE TRIAGE (OUTPATIENT)
Dept: OTHER | Facility: OTHER | Age: 34
End: 2024-06-08

## 2024-06-08 VITALS — TEMPERATURE: 97.9 F | HEART RATE: 68 BPM

## 2024-06-08 DIAGNOSIS — G89.18 ACUTE POST-OPERATIVE PAIN: ICD-10-CM

## 2024-06-08 DIAGNOSIS — K35.30 ACUTE APPENDICITIS WITH LOCALIZED PERITONITIS, WITHOUT PERFORATION, ABSCESS, OR GANGRENE: Primary | ICD-10-CM

## 2024-06-08 DIAGNOSIS — Z90.49 STATUS POST APPENDECTOMY: Primary | ICD-10-CM

## 2024-06-08 PROCEDURE — 99212 OFFICE O/P EST SF 10 MIN: CPT | Performed by: PHYSICIAN ASSISTANT

## 2024-06-08 RX ORDER — HYDROCODONE BITARTRATE AND ACETAMINOPHEN 5; 325 MG/1; MG/1
1 TABLET ORAL EVERY 6 HOURS PRN
Qty: 8 TABLET | Refills: 0 | Status: SHIPPED | OUTPATIENT
Start: 2024-06-08

## 2024-06-08 NOTE — TELEPHONE ENCOUNTER
Reason for Disposition  • [1] SEVERE post-op pain (e.g., excruciating, pain scale 8-10) AND [2] not controlled with pain medications    Protocols used: Post-Op Symptoms and Questions-ADULT-AH

## 2024-06-08 NOTE — TELEPHONE ENCOUNTER
"Answer Assessment - Initial Assessment Questions  1. SYMPTOM: \"What's the main symptom you're concerned about?\" (e.g., pain, fever, vomiting)      Pain    2. ONSET: \"When did pain  start?\"      Yesterday evening    3. SURGERY: \"What surgery was performed?\"      Appendix    4. DATE of SURGERY: \"When was surgery performed?\"       Wednesday    5. ANESTHESIA: \" What type of anesthesia did you have?\" (e.g., general, spinal, epidural, local)      Unknown    6. PAIN: \"Is there any pain?\" If Yes, ask: \"How bad is it?\"  (Scale 1-10; or mild, moderate, severe)      8/10, between left incision site and navel    7. FEVER: \"Do you have a fever?\" If Yes, ask: \"What is your temperature, how was it measured, and when did it start?\"      Denies    8. VOMITING: \"Is there any vomiting?\" If yes, ask: \"How many times?\"      Denies    9. BLEEDING: \"Is there any bleeding?\" If Yes, ask: \"How much?\" and \"Where?\"      Denies    10. OTHER SYMPTOMS: \"Do you have any other symptoms?\" (e.g., drainage from wound, painful urination, constipation)        Burning pain with laying down; feels balloon like when standing    Protocols used: Post-Op Symptoms and Questions-ADULT-AH      Patient with pain unrelieved by tylenol; pt resistant to narcotic use due to past addiction; did have discussion with pt regarding safety/accountability and relayed to oncall provider.  Script to be called in.  Pt advised and verbalized understanding.  "

## 2024-06-08 NOTE — PROGRESS NOTES
Required Documentation:  Encounter provider: Sim Lemus PA-C    Identify all parties in room with patient during virtual visit:  child(rm)- permission granted    The patient was identified by name and date of birth. Samia Bull was informed that this is a telemedicine visit and that the visit is being conducted through the [employer] platform. She agrees to proceed..  My office door was closed. No one else was in the room.  She acknowledged consent and understanding of privacy and security of the video platform. The patient has agreed to participate and understands they can discontinue the visit at any time.    Verification of patient location:  Patient is located at Home in the following state in which I hold an active license [place]    Patient is aware this is a billable service.     Reason for visit is No chief complaint on file.       Subjective  HPI   Pt reports she has been having a lot of pain, is not taking narcotics, when walking around lower abdomen feels hard and firm. When laying flat and getting up it hurts. Had chills last night, does use medical marijuana however. No fevers. No nausea or vomiting. No diarrhea, is moving her bowels. He is passing flatus. She feels pain has stayed the same since surgery.    Past Medical History:   Diagnosis Date   • Anxiety    • Asthma    • Depression    • Eating disorder    • GERD (gastroesophageal reflux disease)    • Headache(784.0)    • Herpes simplex infection    • Infectious viral hepatitis    • Palpitations    • Psychiatric disorder    • Sciatica    • Substance abuse (HCC)    • Urinary tract infection        Past Surgical History:   Procedure Laterality Date   • ABDOMINAL SURGERY     • APPENDECTOMY     • APPENDECTOMY LAPAROSCOPIC N/A 6/5/2024    Procedure: APPENDECTOMY LAPAROSCOPIC;  Surgeon: Ignacia Vaughan MD;  Location: [facility];  Service: General   • WISDOM TOOTH EXTRACTION          Allergies   Allergen Reactions   • Lactose - Food  Allergy Diarrhea   • Petrolatum Itching   • White Petrolatum Rash       Review of Systems   Constitutional:  Positive for chills. Negative for fever.   HENT:  Negative for congestion, ear pain, postnasal drip, rhinorrhea, sinus pressure, sinus pain, sore throat and trouble swallowing.    Eyes:  Negative for pain and visual disturbance.   Respiratory:  Negative for cough, shortness of breath and wheezing.    Cardiovascular:  Negative for chest pain.   Gastrointestinal:  Positive for abdominal distention (lower abdominal pain) and abdominal pain. Negative for diarrhea, nausea and vomiting.   Neurological:  Positive for dizziness. Negative for headaches.   All other systems reviewed and are negative.      Video Exam    Vitals:    06/08/24 0829   Pulse: 68   Temp: 97.9 °F (36.6 °C)   TempSrc: Skin       Physical Exam  Constitutional:       General: She is not in acute distress.     Appearance: Normal appearance. She is not ill-appearing or toxic-appearing.   HENT:      Head: Normocephalic and atraumatic.      Nose: No rhinorrhea.      Mouth/Throat:      Mouth: Mucous membranes are moist.      Pharynx: No posterior oropharyngeal erythema.   Eyes:      General: No scleral icterus.        Right eye: No discharge.         Left eye: No discharge.      Extraocular Movements: Extraocular movements intact.   Cardiovascular:      Rate and Rhythm: Normal rate.   Pulmonary:      Effort: Pulmonary effort is normal. No respiratory distress.   Abdominal:      General: Abdomen is flat. There is no distension.      Palpations: Abdomen is soft.      Tenderness: There is abdominal tenderness. There is rebound (equivocal). There is no guarding.      Comments: All on patient self directed exam   Musculoskeletal:      Cervical back: Neck supple.   Skin:     Findings: No erythema.   Neurological:      Mental Status: She is alert and oriented to person, place, and time.      Cranial Nerves: No dysarthria or facial asymmetry.   Psychiatric:          Mood and Affect: Mood normal.         Behavior: Behavior normal.         Visit Time  Total Visit Duration: 13 minutes    Assessment/Plan:    Diagnoses and all orders for this visit:    Status post appendectomy    Acute post-operative pain      Patient is currently afebile normal pulse postop day 3 laparoscopic appendectomy.  Her pain has remained relatively constant since surgery.  She is denying any nausea vomiting or diarrhea.  Her abdominal exam seems to be benign questionable rebound tenderness.  Her surgical sites appear to be healing well without any evidence of infection on limited visualization from virtual exam.  Do not suspect any serious complication at this time however given she is 3 days out from this surgery I recommended to her that she contact the surgeons office and speak with the on-call surgeon over the weekend to inform them of the situation as well to get their recommendations for her pain and pain control as well as for overall recommendations.  I did recommend to her if she has any worsening pain nausea vomiting fever chills etc. to proceed to the ED.    There are no Patient Instructions on file for this visit.

## 2024-06-10 PROCEDURE — 88304 TISSUE EXAM BY PATHOLOGIST: CPT | Performed by: SPECIALIST

## 2024-06-10 NOTE — CARE ANYWHERE EVISITS
Visit Summary for Samia Bull - Gender: Female - Date of Birth: 1990  Date: [id number] - Duration: 13 minutes  Patient: Samia Bull  Provider: Sim Lemus PA-C    Patient Contact Information  Address  [address] [place] [address] [address]  [place]; [place] [address]  [phone]    Visit Topics  Post-op laparoscopic appendectomy pain [Added By: Self - 2024-06-08]  Stomachache [Added By: Self - 2024-06-08]    Triage Questions   What is your current physical address in the event of a medical emergency? Answer []  Are you allergic to any medications? Answer []  Are you now or could you be pregnant? Answer []  Do you have any immune system compromise or chronic lung   disease? Answer []  Do you have any vulnerable family members in the home (infant, pregnant, cancer, elderly)? Answer []     Conversation Transcripts  [0A][0A] [Notification] You are connected with Sim Lemus PA-C, Urgent Care Specialist.[0A][Notification] Samia Bull is located in [place].[0A][Notification] Samia Bull has shared health history...[0A]    Diagnosis  Other acute postprocedural pain    Procedures  Value: [id number] Code: CPT-4 UNLISTED E&M SERVICE    Medications Prescribed    No prescriptions ordered    Electronically signed by: Sim Lemus PA-C(NPI [id number])

## 2024-06-10 NOTE — CARE ANYWHERE EVISITS
Visit Summary for Samia Bull - Gender: Female - Date of Birth: 1990  Date: [id number] - Duration: 3 minutes  Patient: Samia Bull  Provider: Griffin Phillips    Patient Contact Information  Address  [address] [place], [place] [address] [place]; [place] [address]  [phone]    Visit Topics  Several stomach pains since 15:00 yesterday, unable to sleep, constant vomiting and unable to keep fluids down coupled with nausea  [Added By: Self - 2024-06-05]    Triage Questions   What is your current physical address in the event of a medical emergency? Answer []  Are you allergic to any medications? Answer []  Are you now or could you be pregnant? Answer []  Do you have any immune system compromise or chronic lung   disease? Answer []  Do you have any vulnerable family members in the home (infant, pregnant, cancer, elderly)? Answer []     Conversation Transcripts  [0A][0A] [Notification] You are connected with Griffin hPillips, Family Physician.[0A][Notification] Samia Bull is located in [place].[0A][Notification] Samia Bull has shared health history...[0A][Notification] Griffin Phillips has added a   diagnosis/procedure code.[0A]    Diagnosis  Generalized abdominal pain    Procedures    Medications Prescribed    No prescriptions ordered    Provider Notes  [0A][0A] We strongly encourage you to share the following record of today's visit with your primary care physician. [0A]Contact phone number: [0A]Mode of Communication:  Video[0A]HPI: The patient started having nausea and vomiting that started last night   had diarrhea. Severe abdominal pain. No fevers or blood or recent travel out of the country. Has history of ulcer.[0A]PMH: Syncope[0A]PSH: None[0A]Meds: Depo, valacyclovir[0A]Allergies: petroleum jellyLMP: Depo 2014[0A]Exam: [0A]Gen: Alert, normal   mental status and interaction, no visible distress, non- toxic appearance. ABD: Tender to patient palpation[0A]Assessment:  Abdominal pain[0A]Plan:  [0A]1. I am recommending in person exam at ER now for further evaluation and treatment2. Discussed   precautions. [0A]Follow up:[0A]1. If there are any questions or problems with the prescription, call [phone] anytime for assistance. [0A]2. Please see an in-person provider now[0A]3. Taking a probiotic (either in pill form or by eating yogurt that   contains probiotics) while using antibiotics can help prevent some of the troublesome side effects that antibiotics can sometimes cause.[0A]4. Please print a copy of this note and send it to your regular doctor, or take it to your next visit so it may be   included in your medical record. [0A]Patient voiced understanding and agrees to plan.[0A]Please see your PCP on an annual basis. [0A]    Electronically signed by: Griffin Phillips(NPI [id number])

## 2024-06-12 PROBLEM — Z00.00 ADULT GENERAL MEDICAL EXAMINATION: Status: RESOLVED | Noted: 2024-05-13 | Resolved: 2024-06-12

## 2024-06-14 ENCOUNTER — NURSE TRIAGE (OUTPATIENT)
Dept: SURGERY | Facility: CLINIC | Age: 34
End: 2024-06-14

## 2024-06-14 NOTE — TELEPHONE ENCOUNTER
"Pt of eagle [facility] on 6/5/24.    REASON FOR CALL: left hip pain    Pt calling because she is experiencing left hip pain.  Pt states the pain started at 0200.  Pt describes pain as \"sharp and fiery.\"  Denies any other symptoms.  Pt states she used ice when the pain started and it helped for awhile, then returned.  Pt has not taken any pain medication.  Pt states she has not been exercising or doing any strenuous activity.  States the initial pain was when laying flat in bed and then while sitting in office chair.  Pt states she has an ergonomic office chair with foot rest  and elevates legs and reclines slightly, but was not sitting in any unusual position.      Recommend she try some Tylenol or Ibuprofen and see if this relieves the pain.  Pt states she only has Aleve.  Advise this is fine to take. Pt would still like provider to be made aware and would like a call back confirming that she doesn't need to do anything else.      [phone].      Answer Assessment - Initial Assessment Questions  1. LOCATION and RADIATION: \"Where is the pain located?\"       Left hip, denies radiation  2. QUALITY: \"What does the pain feel like?\"  (e.g., sharp, dull, aching, burning)      Sharp and burning   3. SEVERITY: \"How bad is the pain?\" \"What does it keep you from doing?\"   (Scale 1-10; or mild, moderate, severe)    -  MILD (1-3): doesn't interfere with normal activities     -  MODERATE (4-7): interferes with normal activities (e.g., work or school) or awakens from sleep, limping     -  SEVERE (8-10): excruciating pain, unable to do any normal activities, unable to walk    Moderate, notices more with movement  4. ONSET: \"When did the pain start?\" \"Does it come and go, or is it there all the time?\"      0200 today   5. WORK OR EXERCISE: \"Has there been any recent work or exercise that involved this part of the body?\"       Denies.  Seated for work.    6. CAUSE: \"What do you think is causing the hip pain?\"       " "Unsure   7. AGGRAVATING FACTORS: \"What makes the hip pain worse?\" (e.g., walking, climbing stairs, running)      Changing positions  8. OTHER SYMPTOMS: \"Do you have any other symptoms?\" (e.g., back pain, pain shooting down leg,  fever, rash)      Denies    Protocols used: Hip Pain-ADULT-[place]    "

## 2024-06-19 ENCOUNTER — OFFICE VISIT (OUTPATIENT)
Dept: SURGERY | Facility: CLINIC | Age: 34
End: 2024-06-19

## 2024-06-19 VITALS
WEIGHT: 125 LBS | TEMPERATURE: 99 F | OXYGEN SATURATION: 99 % | HEIGHT: 62 IN | HEART RATE: 65 BPM | BODY MASS INDEX: 23 KG/M2

## 2024-06-19 DIAGNOSIS — Z98.890 POST-OPERATIVE STATE: Primary | ICD-10-CM

## 2024-06-19 PROCEDURE — 99024 POSTOP FOLLOW-UP VISIT: CPT | Performed by: SURGERY

## 2024-06-19 NOTE — PROGRESS NOTES
Ambulatory Visit  Name: Samia Bull      : 1990      MRN: [id number]  Encounter Provider: Ignacia Vaughan MD  Encounter Date: 2024   Encounter department: [facility] SURGERY [address] [place] [address]    Assessment & Plan   1. Post-operative state  Status post laparoscopic appendectomy, incisions are healing well.  Pathology was reviewed.  Provided reassurance that the incisions look great and the mild lower abdominal swelling will improve with time.  Discussed increasing activity and she is cleared to start working out again as long as she eases back into it, left lower quadrant incision may cause discomfort for another 2 weeks as the stitch dissolves.  She is cleared to return to work without restrictions and has no dietary restrictions.    History of Present Illness     Samia Bull is a 34 y.o. female who presents for postop status post laparoscopic appendectomy on .  She has used magnesium citrate every few days if she is constipated and has no increasing abdominal pain cramping or loose stools when she uses this. No fevers or chills. No significant pain currently but did have difficulty postop but she did not take any narcotic pain medicine and only use Tylenol.  She reports some difficulty with coping after surgery with her history of body dysmorphic disorder as she has some lower abdominal swelling.  She also reported some suicidal ideation and spoke to an emergency therapist about this, says that these thoughts are pervasive but she is trying to cope.    Review of Systems   Constitutional:  Negative for fever.   Gastrointestinal:  Positive for constipation. Negative for abdominal pain, diarrhea, nausea and vomiting.   Psychiatric/Behavioral:  Positive for suicidal ideas.    All other systems reviewed and are negative.    Past Medical History   Past Medical History:   Diagnosis Date    Anxiety     Asthma     Depression     Eating disorder     GERD (gastroesophageal reflux  "disease)     Headache(784.0)     Herpes simplex infection     Infectious viral hepatitis     Palpitations     Psychiatric disorder     Sciatica     Substance abuse (HCC)     Urinary tract infection      Past Surgical History:   Procedure Laterality Date    ABDOMINAL SURGERY      APPENDECTOMY      APPENDECTOMY LAPAROSCOPIC N/A 6/5/2024    Procedure: APPENDECTOMY LAPAROSCOPIC;  Surgeon: Ignacia Vaughan MD;  Location: [facility] OR;  Service: General    WISDOM TOOTH EXTRACTION       Family History   Problem Relation Age of Onset    Hypertension Mother     Arthritis Mother      Current Outpatient Medications on File Prior to Visit   Medication Sig Dispense Refill    medroxyPROGESTERone (Depo-Provera) 150 mg/mL injection Inject 1 mL (150 mg total) into a muscle every 3 (three) months 1 mL 0    valACYclovir (VALTREX) 500 mg tablet Take 500 mg by mouth as needed      [DISCONTINUED] HYDROcodone-acetaminophen (Norco) 5-325 mg per tablet Take 1 tablet by mouth every 6 (six) hours as needed for pain Max Daily Amount: 4 tablets 8 tablet 0     No current facility-administered medications on file prior to visit.     Allergies   Allergen Reactions    Lactose - Food Allergy Diarrhea    Petrolatum Itching    White Petrolatum Rash      Objective     Pulse 65   Temp 99 °F (37.2 °C) (Tympanic)   Ht 5' 2\" (1.575 m)   Wt 56.7 kg (125 lb)   SpO2 99%   BMI 22.86 kg/m²     Physical Exam  Vitals reviewed.   Constitutional:       General: She is not in acute distress.     Appearance: Normal appearance. She is normal weight.   HENT:      Head: Normocephalic and atraumatic.      Nose: Nose normal.      Mouth/Throat:      Mouth: Mucous membranes are moist.      Pharynx: Oropharynx is clear.   Eyes:      Extraocular Movements: Extraocular movements intact.      Conjunctiva/sclera: Conjunctivae normal.      Pupils: Pupils are equal, round, and reactive to light.   Pulmonary:      Effort: Pulmonary effort is normal. No respiratory distress. "   Abdominal:      General: Abdomen is flat. There is no distension.      Palpations: Abdomen is soft.      Tenderness: There is no abdominal tenderness.       Musculoskeletal:         General: No swelling or tenderness. Normal range of motion.      Cervical back: Normal range of motion and neck supple.   Skin:     General: Skin is warm and dry.   Neurological:      General: No focal deficit present.      Mental Status: She is alert and oriented to person, place, and time.       Administrative Statements

## 2024-06-19 NOTE — LETTER
June 19, 2024     Patient: Samia Bull  YOB: 1990  Date of Visit: 6/19/2024      To Whom it May Concern:    Samia Bull is under my professional care. Samia was seen in my office on 6/19/2024. Samia is cleared to drive and return to work without restrictions.    If you have any questions or concerns, please don't hesitate to call.         Sincerely,          Ignacia Vaughan MD        CC: No Recipients

## 2024-08-06 ENCOUNTER — PATIENT MESSAGE (OUTPATIENT)
Dept: FAMILY MEDICINE CLINIC | Facility: CLINIC | Age: 34
End: 2024-08-06

## 2024-08-06 ENCOUNTER — PATIENT MESSAGE (OUTPATIENT)
Dept: CARDIOLOGY CLINIC | Facility: CLINIC | Age: 34
End: 2024-08-06

## 2024-08-07 NOTE — TELEPHONE ENCOUNTER
Placed in fax bin, please see if patient would like us to fax for her or if she'd like to pick it up.

## 2024-08-07 NOTE — TELEPHONE ENCOUNTER
Filled it out with updated information for nurse visits. Please email to patient again. Will place in fax bin.

## 2024-08-08 NOTE — PATIENT COMMUNICATION
Received call from pt asking for an update on the paperwork. She stated she needs the paperwork back by 8/12. Please advise.

## 2024-08-12 ENCOUNTER — TELEPHONE (OUTPATIENT)
Age: 34
End: 2024-08-12

## 2024-08-12 NOTE — TELEPHONE ENCOUNTER
Received call from pt.  She is asking if the accomodation form she sent via [employer] 8/6 has been completed by Dr. Dhaval floyd. Pt needs to submit it to her job's HR today.    Please advise.

## 2024-08-13 NOTE — PATIENT COMMUNICATION
Pt called asking if paperwork has been completed.  She needs to submit to her job's HR asap. Please advise.

## 2024-08-14 ENCOUNTER — TELEPHONE (OUTPATIENT)
Dept: CARDIOLOGY CLINIC | Facility: CLINIC | Age: 34
End: 2024-08-14

## 2024-08-14 NOTE — TELEPHONE ENCOUNTER
Pt called to check status of paperwork.  Transferred pt to Kiara in [place] to assist further regarding this.

## 2024-08-14 NOTE — TELEPHONE ENCOUNTER
Pt is calling because on 8/6/24 patient sent a form to be completed by Dr Puentes through my chart.Form can be view on 8/6 entry..the patient needs form completed ASAP.  Please complete.  Thank you. This is needed for employer [employer].

## 2024-08-15 ENCOUNTER — CLINICAL SUPPORT (OUTPATIENT)
Dept: FAMILY MEDICINE CLINIC | Facility: CLINIC | Age: 34
End: 2024-08-15
Payer: COMMERCIAL

## 2024-08-15 DIAGNOSIS — Z30.42 ON DEPO-PROVERA FOR CONTRACEPTION: ICD-10-CM

## 2024-08-15 DIAGNOSIS — Z30.42 ON DEPO-PROVERA FOR CONTRACEPTION: Primary | ICD-10-CM

## 2024-08-15 PROCEDURE — 96372 THER/PROPH/DIAG INJ SC/IM: CPT

## 2024-08-15 RX ORDER — MEDROXYPROGESTERONE ACETATE 150 MG/ML
150 INJECTION, SUSPENSION INTRAMUSCULAR ONCE
Status: COMPLETED | OUTPATIENT
Start: 2024-08-15 | End: 2024-08-15

## 2024-08-15 RX ADMIN — MEDROXYPROGESTERONE ACETATE 150 MG: 150 INJECTION, SUSPENSION INTRAMUSCULAR at 15:53

## 2024-08-16 RX ORDER — MEDROXYPROGESTERONE ACETATE 150 MG/ML
150 INJECTION, SUSPENSION INTRAMUSCULAR
Qty: 1 ML | Refills: 0 | Status: SHIPPED | OUTPATIENT
Start: 2024-08-16

## 2024-08-19 ENCOUNTER — DOCUMENTATION (OUTPATIENT)
Dept: CARDIOLOGY CLINIC | Facility: CLINIC | Age: 34
End: 2024-08-19

## 2024-08-19 NOTE — TELEPHONE ENCOUNTER
Pt returned call. Her HR does not have a fax number, they have an email but she was unsure if she could provide it.  She was unable to view document in her MyChart.    She requests we email it to her.  Person email- [email or web address]  Work email- [email or web address]

## 2024-08-19 NOTE — TELEPHONE ENCOUNTER
Pt call in regarding forms. Spoke to Kiara from the office who is going to work on getting forms completed.     Pt states that she needs to send the forms to HR today by 430pm.     FYKATHI

## 2024-08-19 NOTE — TELEPHONE ENCOUNTER
Form completed  Called patient LM complete need  fax number to where  to fax or if she wants to  in office.  Scanned into her chart.

## 2024-09-05 ENCOUNTER — REMOTE DEVICE CLINIC VISIT (OUTPATIENT)
Dept: CARDIOLOGY CLINIC | Facility: CLINIC | Age: 34
End: 2024-09-05
Payer: COMMERCIAL

## 2024-09-05 DIAGNOSIS — R55 SYNCOPE AND COLLAPSE: Primary | ICD-10-CM

## 2024-09-05 PROCEDURE — 93298 REM INTERROG DEV EVAL SCRMS: CPT | Performed by: INTERNAL MEDICINE

## 2024-09-05 NOTE — PROGRESS NOTES
"Results for orders placed or performed in visit on 09/05/24   Cardiac EP device report    Narrative    BSCI LUX-dx ICM - ACTIVE SYSTEM IS MRI CONDITIONAL  LATITUDE TRANSMISSION: LOOP: BATTERY STATUS \"OK.\" PRESENTING RHYTHM NSR @ 80 BPM. NO  NEW  PATIENT OR DEVICE ACTIVATED EPISODES  SINCE LAST REPORT ON 8/1/24). NORMAL DEVICE FUNCTION. CH        "

## 2024-09-23 ENCOUNTER — TELEPHONE (OUTPATIENT)
Dept: OBGYN CLINIC | Facility: CLINIC | Age: 34
End: 2024-09-23

## 2024-09-23 NOTE — TELEPHONE ENCOUNTER
----- Message from VIOLETA Blake sent at 9/23/2024 11:16 AM EDT -----  Regarding: reason for appt  Pls call pt and determine reason for appt

## 2024-09-23 NOTE — TELEPHONE ENCOUNTER
FYI - Patient returned call for NP appt 9/24. 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 Pt aware bc consult may be separate appt from  yearly.

## 2024-09-23 NOTE — PROGRESS NOTES
Assessment/Plan:  The patient will have next DMPA in October. Insurance info on Nexplanon given and pt will call. DEXA ordered. Pt advised to check with ins company for coverage. Recommended monthly SBE and annual CBE. ASCCP guidelines reviewed and pap collected today. The patient declines STI testing at this time. She is aware that condoms are recommended with all sexual contact for STI prevention. Reviewed diet/activity recommendations. Return to the office in one year for routine annual gyn exam or sooner PRN.      Diagnoses and all orders for this visit:    Encounter for gynecological examination (general) (routine) without abnormal findings    Tubal ligation evaluation  -     Ambulatory Referral to Obstetrics / Gynecology    Encounter for Papanicolaou smear for cervical cancer screening  -     Liquid-based pap, screening        Subjective:      Patient ID: Samia Bull is a 34 y.o. female.    This new patient presents for routine annual gyn exam.   Pt is using DMPA which she receives at her PCP. She has been on DMPA for 11 years. She is interested in Nexplanon. Also considering a TL however is on DMPA for heavy, painful periods.   Menses are absent.   Has hx of abnormal paps.  She denies breast concerns, abn discharge, pelvic pain, bowel/bladder dysfunction, depression/anxiety.   Monogamous relationship, 2 years. Denies STI concerns.                The following portions of the patient's history were reviewed and updated as appropriate: allergies, current medications, past family history, past medical history, past social history, past surgical history and problem list.    Review of Systems   Constitutional: Negative.    HENT: Negative.     Respiratory: Negative.     Cardiovascular: Negative.    Gastrointestinal: Negative.    Endocrine: Negative.    Genitourinary:  Negative for difficulty urinating, dyspareunia, dysuria, frequency, menstrual problem, pelvic pain, urgency, vaginal bleeding, vaginal discharge  "and vaginal pain.   Musculoskeletal: Negative.    Skin: Negative.    Neurological: Negative.    Psychiatric/Behavioral: Negative.           Objective:      /62 (BP Location: Right arm, Patient Position: Sitting, Cuff Size: Standard)   Ht 5' 2\" (1.575 m)   Wt 55.8 kg (123 lb)   BMI 22.50 kg/m²          Physical Exam  Vitals and nursing note reviewed. Exam conducted with a chaperone present.   Constitutional:       Appearance: Normal appearance. She is well-developed.   HENT:      Head: Normocephalic and atraumatic.   Neck:      Thyroid: No thyroid mass or thyromegaly.   Cardiovascular:      Rate and Rhythm: Normal rate and regular rhythm.      Heart sounds: Normal heart sounds.   Pulmonary:      Effort: Pulmonary effort is normal.      Breath sounds: Normal breath sounds.   Chest:   Breasts:     Breasts are symmetrical.      Right: No inverted nipple, mass, nipple discharge, skin change or tenderness.      Left: No inverted nipple, mass, nipple discharge, skin change or tenderness.       Abdominal:      General: Bowel sounds are normal.      Palpations: Abdomen is soft.      Tenderness: There is no abdominal tenderness.      Hernia: There is no hernia in the left inguinal area or right inguinal area.   Genitourinary:     General: Normal vulva.      Exam position: Supine.      Pubic Area: No rash.       Labia:         Right: No rash, tenderness, lesion or injury.         Left: No rash, tenderness, lesion or injury.       Urethra: No prolapse, urethral pain, urethral swelling or urethral lesion.      Vagina: Normal. No signs of injury and foreign body. No vaginal discharge, erythema, tenderness, bleeding, lesions or prolapsed vaginal walls.      Cervix: No cervical motion tenderness, discharge, friability, lesion, erythema, cervical bleeding or eversion.      Uterus: Not deviated, not enlarged, not fixed, not tender and no uterine prolapse.       Adnexa:         Right: No mass, tenderness or fullness.          " Left: No mass, tenderness or fullness.        Rectum: No external hemorrhoid.      Comments: Urethra normal without lesions  No bladder tenderness  Musculoskeletal:         General: Normal range of motion.      Cervical back: Normal range of motion and neck supple.   Lymphadenopathy:      Lower Body: No right inguinal adenopathy. No left inguinal adenopathy.   Skin:     General: Skin is warm and dry.   Neurological:      Mental Status: She is alert and oriented to person, place, and time.   Psychiatric:         Speech: Speech normal.         Behavior: Behavior normal. Behavior is cooperative.

## 2024-09-24 ENCOUNTER — OFFICE VISIT (OUTPATIENT)
Dept: GYNECOLOGY | Facility: CLINIC | Age: 34
End: 2024-09-24
Payer: COMMERCIAL

## 2024-09-24 VITALS
DIASTOLIC BLOOD PRESSURE: 62 MMHG | SYSTOLIC BLOOD PRESSURE: 109 MMHG | HEIGHT: 62 IN | BODY MASS INDEX: 22.63 KG/M2 | WEIGHT: 123 LBS

## 2024-09-24 DIAGNOSIS — Z01.818 TUBAL LIGATION EVALUATION: ICD-10-CM

## 2024-09-24 DIAGNOSIS — Z01.419 ENCOUNTER FOR GYNECOLOGICAL EXAMINATION (GENERAL) (ROUTINE) WITHOUT ABNORMAL FINDINGS: Primary | ICD-10-CM

## 2024-09-24 DIAGNOSIS — Z91.89 AT HIGH RISK FOR OSTEOPOROSIS: ICD-10-CM

## 2024-09-24 DIAGNOSIS — Z79.899 HIGH RISK MEDICATION USE: ICD-10-CM

## 2024-09-24 DIAGNOSIS — Z12.4 ENCOUNTER FOR PAPANICOLAOU SMEAR FOR CERVICAL CANCER SCREENING: ICD-10-CM

## 2024-09-24 PROCEDURE — G0476 HPV COMBO ASSAY CA SCREEN: HCPCS | Performed by: OBSTETRICS & GYNECOLOGY

## 2024-09-24 PROCEDURE — G0145 SCR C/V CYTO,THINLAYER,RESCR: HCPCS | Performed by: OBSTETRICS & GYNECOLOGY

## 2024-09-24 PROCEDURE — S0610 ANNUAL GYNECOLOGICAL EXAMINA: HCPCS | Performed by: OBSTETRICS & GYNECOLOGY

## 2024-09-25 LAB
HPV HR 12 DNA CVX QL NAA+PROBE: POSITIVE
HPV16 DNA CVX QL NAA+PROBE: NEGATIVE
HPV18 DNA CVX QL NAA+PROBE: NEGATIVE

## 2024-09-30 LAB
LAB AP GYN PRIMARY INTERPRETATION: NORMAL
Lab: NORMAL

## 2024-10-07 ENCOUNTER — TELEPHONE (OUTPATIENT)
Age: 34
End: 2024-10-07

## 2024-10-07 DIAGNOSIS — R51.9 FREQUENT HEADACHES: ICD-10-CM

## 2024-10-07 DIAGNOSIS — Z82.0 FHX: MIGRAINE HEADACHES: Primary | ICD-10-CM

## 2024-10-07 NOTE — TELEPHONE ENCOUNTER
I would recommend seeing a neurologist for her migraines if she is concerned about the Depo shot being a causative factor of her symptoms. She had a normal Ct and MRI back in 2022 with no tumor noted. If she is concerned about the Depo, I would also recommend talking with OBGYN about other options - it looks like she saw them at the end of September and they were considering Nexplanon, which I think would be a good option for her.

## 2024-10-07 NOTE — TELEPHONE ENCOUNTER
Patient aware of PCP recommendations and would like a referral placed for Neurology. Patient would like a call once the referral is placed so she can schedule with them. She will follow up with her OBGYN as well.

## 2024-10-07 NOTE — TELEPHONE ENCOUNTER
Patient states there is a lawsuit regarding depo causing brain tumors. She is asking what actions she should take if she were to be a part of this due to her frequent migraines and past history of syncope episode.

## 2024-10-08 ENCOUNTER — TELEPHONE (OUTPATIENT)
Dept: GYNECOLOGY | Facility: CLINIC | Age: 34
End: 2024-10-08

## 2024-10-08 ENCOUNTER — TELEPHONE (OUTPATIENT)
Age: 34
End: 2024-10-08

## 2024-10-08 NOTE — TELEPHONE ENCOUNTER
Samia would like Nexplanon inserted in Jan and has decided to have her Depo one last time. Pt. States She scheduled appt with neurology due to concerns about Depo.

## 2024-10-08 NOTE — TELEPHONE ENCOUNTER
Spoke with patient regarding below MyChart message.  She was advised provider is out of the office until 10/21.  She was given next available appointment for 11/25 to discuss concerns.  No further questions or concerns at this time.      Hello,     I have a Depo Provera appt scheduled for 10/31, however, I am concerned about the current lawsuit against [employer] regarding the Depo Provera causing brain tumors in patients.      I have a referral for Neurology to be tested.  I would like to know if I should proceed with my Depo injection or dispose of it and schedule an appt for the Nexplanon?  I have confirmed with my insurance that the procedure and birth control method is covered.     Please advise.     Thank you,  Samia

## 2024-10-21 ENCOUNTER — TELEPHONE (OUTPATIENT)
Age: 34
End: 2024-10-21

## 2024-10-21 ENCOUNTER — OFFICE VISIT (OUTPATIENT)
Dept: URGENT CARE | Facility: CLINIC | Age: 34
End: 2024-10-21
Payer: COMMERCIAL

## 2024-10-21 ENCOUNTER — APPOINTMENT (OUTPATIENT)
Dept: RADIOLOGY | Facility: CLINIC | Age: 34
End: 2024-10-21
Payer: COMMERCIAL

## 2024-10-21 VITALS
OXYGEN SATURATION: 100 % | SYSTOLIC BLOOD PRESSURE: 110 MMHG | TEMPERATURE: 99.7 F | HEART RATE: 118 BPM | DIASTOLIC BLOOD PRESSURE: 64 MMHG | RESPIRATION RATE: 18 BRPM

## 2024-10-21 DIAGNOSIS — T14.91XA SUICIDAL BEHAVIOR WITH ATTEMPTED SELF-INJURY (HCC): ICD-10-CM

## 2024-10-21 DIAGNOSIS — S99.921A INJURY OF RIGHT FOOT, INITIAL ENCOUNTER: ICD-10-CM

## 2024-10-21 DIAGNOSIS — S99.921A INJURY OF RIGHT FOOT, INITIAL ENCOUNTER: Primary | ICD-10-CM

## 2024-10-21 PROCEDURE — 99214 OFFICE O/P EST MOD 30 MIN: CPT | Performed by: NURSE PRACTITIONER

## 2024-10-21 PROCEDURE — 73630 X-RAY EXAM OF FOOT: CPT

## 2024-10-21 NOTE — PATIENT INSTRUCTIONS
Patient Education     Metatarsalgia   The Basics   Written by the doctors and editors at [facility]   What is metatarsalgia? -- Metatarsalgia is a condition that causes pain in the ball of the foot. It happens when there is inflammation in the metatarsals, which are the foot bones closest to the toes (figure 1).  Different things can cause metatarsalgia. It can happen in people who run or do other activities that put a lot of pressure on the feet. It can also happen in people who wear tight-fitting shoes a lot or have certain foot problems.  What are the symptoms of metatarsalgia? -- Metatarsalgia causes pain in the ball of the foot. The pain can also spread to the toes. The pain is usually worse when people run or do other activities that put pressure on their feet.  Will I need tests? -- Probably not. Your doctor or nurse should be able to tell if you have metatarsalgia by learning about your symptoms and doing an exam. They might do an ultrasound or order an X-ray of your foot to make sure your symptoms are not caused by another condition.  How is metatarsalgia treated? -- Treatment for metatarsalgia usually involves 1 or more of the following:   Rest your foot - Give your foot a chance to heal by resting. But don't completely stop being active. You can do activities that put less pressure on your feet, such as swimming.   Put ice on your foot when it hurts or after activities that cause pain - You can put a cold gel pack, bag of ice, or bag of frozen vegetables on the painful area every 1 to 2 hours, for 15 minutes each time. Put a thin towel between the ice (or other cold object) and your skin.   Taking a pain-relieving medicine, such as acetaminophen (sample brand name: Tylenol), ibuprofen (sample brand names: Advil, Motrin), or naproxen (sample brand name: Aleve).   Wear sturdy shoes and a metatarsal insert - Sneakers with a lot of cushion and good arch and heel support are best. Your doctor will also probably  "recommend that you put a padded insert called a \"metatarsal pad\" into your shoe.   Wear arch supports or special shoe inserts called \"orthotics\" that are made to fit your foot  In most cases, these treatments help improve symptoms. But if your symptoms don't improve, your doctor might talk with you about other options. This might involve surgery to correct the position of your foot bones.  How long does metatarsalgia take to heal? -- Metatarsalgia can take weeks to months to heal, depending on the cause and your symptoms.  All topics are updated as new evidence becomes available and our peer review process is complete.  This topic retrieved from [facility] on: Mar 23, 2024.  Topic 91290 Version 5.0  Release: 32.2.4 - C32.81  © 2024 UpToDate, Inc. and/or its affiliates. All rights reserved.  figure 1: Foot and ankle bones     This drawing shows the foot, toe, and anklebones.  Graphic 40642 Version 2.0  Consumer Information Use and Disclaimer   Disclaimer: This generalized information is a limited summary of diagnosis, treatment, and/or medication information. It is not meant to be comprehensive and should be used as a tool to help the user understand and/or assess potential diagnostic and treatment options. It does NOT include all information about conditions, treatments, medications, side effects, or risks that may apply to a specific patient. It is not intended to be medical advice or a substitute for the medical advice, diagnosis, or treatment of a health care provider based on the health care provider's examination and assessment of a patient's specific and unique circumstances. Patients must speak with a health care provider for complete information about their health, medical questions, and treatment options, including any risks or benefits regarding use of medications. This information does not endorse any treatments or medications as safe, effective, or approved for treating a specific patient. UpToDate, Inc. " and its affiliates disclaim any warranty or liability relating to this information or the use thereof.The use of this information is governed by the Terms of Use, available at https://www.wolterskluwer.com/en/know/clinical-effectiveness-terms. 2024© [employer], Inc. and its affiliates and/or licensors. All rights reserved.  Copyright   © 2024 [employer] Inc. and/or its affiliates. All rights reserved.

## 2024-10-21 NOTE — LETTER
October 21, 2024     Elizabeth Shoemaker PA-C  [address]  [address]  [facility] [address]    Patient: Samia Bull   YOB: 1990   Date of Visit: 10/21/2024        Dear Elizabeth Shoemaker PA-C:    Your patient, Samia Bull was seen in our urgent care department on 10/21/2024. Enclosed is a full report of that visit.  Self harm and suicidal thoughts,  Signed AMA form as she refused ER or [facility] in for mental health visit.      If you have any questions or concerns, please don't hesitate to call.           Sincerely,        VIOLETA Monsivais      CC: [unfilled]    Enclosure

## 2024-10-21 NOTE — TELEPHONE ENCOUNTER
Patient calls with R ankle and R foot pain. Patient denies any swelling, bruising, or redness. Patient accidentally kicked a door. Patient was barefoot. Patient kicked with the ball of her foot. Patient can't really walk heel/toe but can bear weight on the heel. Patient is also having difficulty flexing the ankle. We had no available appointments for this afternoon.     After talking to the patient she started crying and mentioned that she was angry, upset, depressed and kicked the door out of anger. Patient will go to urgent care in [place]. Please call the patient as a follow-up as patient may need to see PCP for an appointment related to depression. Patient can be reached at [phone]

## 2024-10-21 NOTE — PROGRESS NOTES
"  [facility]        NAME: Samia Bull is a 34 y.o. female  : 1990    MRN: [id number]  DATE: 2024  TIME: 6:15 PM    Assessment and Plan   Injury of right foot, initial encounter [S99.921A]  1. Injury of right foot, initial encounter  XR foot 3+ vw right      2. Suicidal behavior with attempted self-injury (HCC)          X-ray done in office.  Images reviewed by myself.  No evidence of fracture noted.  Discussed soft tissue injury with patient.  Recommend rest, ice, compression, elevation.  Okay to weight-bear as there is no fracture.  Recommend wearing supportive shoes.  Patient states she cannot take NSAIDs so discussed Tylenol or Lidoderm patches for pain relief.  Discussed with patient we will not prescribe narcotic pain relievers for soft tissue injuries with no evidence of fracture.  At which time patient became more upset.      As far suicidal behavior with self-harm I did encourage her at length to contact her therapist to which she states she is established however has not reach out to them about her current episode or need for intervention.  Provided information in [place] and walk-in.  Patient states suicidal denies plan.  Discussed with patient at length the risks of not attending the [place] walk-in or the emergency room.  AGAINST MEDICAL ADVICE form signed by patient due to her not going to the ER may result in her suicide death or bodily harm as she is currently dissipating in self-harm.  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    Patient's boyfriend was with her during the visit he denies any questions or concerns  Patient Instructions     Follow up with PCP in 3-5 days.  Proceed to  ER if symptoms worsen.    Chief Complaint     Chief Complaint " "  Patient presents with    Foot Injury     Patient states that she is having a rough day and was feeling depressed and suicidal and wanting to self harm to make herself feel better so she repeatedly kicked the door at home with her right foot and is having terrible pain.  Still report feeling suicidal with no plan. Declined intervention at this time.  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  Still report feeling suicidal with no plan. Declined intervention at this time.  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  She was not having any pain at the time of repeated kicking.  However about noon when the adrenaline wore off she noted intense right foot pain throughout her entire foot.  She did not take any Tylenol or Advil.  She states she cannot take any NSAIDs.  She states she did not apply ice as she felt her foot was cold and numb and did not want to apply any however she does know that she should have.  She did apply an Ace wrap and has been trying to elevate it.    As far as the emotional aspect.  She states she is suicidal however she does not have a plan.  She does have a therapist.  She states she did not contact her therapist today to alert them of the incident.  She called her boyfriend who she states did calm her down.  He is with her in the visit today.  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  She states she will not go to the emergency room and she will not admit herself to the psych unit.        Review of Systems   Review of Systems   All other systems reviewed and are negative.        Current Medications     Long-Term Medications   Medication Sig Dispense Refill    medroxyPROGESTERone (Depo-Provera) 150 mg/mL injection Inject 1 mL (150 mg total) into a muscle every 3 (three) months 1 mL 0    valACYclovir (VALTREX) 500 mg tablet Take 500 mg by mouth as needed         Current Allergies     Allergies as of 10/21/2024 - Reviewed 10/21/2024   Allergen Reaction Noted    Lactose - food allergy Diarrhea 12/02/2010    Latex Itching and Rash 09/24/2024    Petrolatum Itching 02/22/2013    White petrolatum Rash 06/18/2015            The following portions of the patient's history were reviewed and updated as appropriate: allergies, current medications, past family history, past medical history, past social history, past surgical history and problem list.    Objective   /64   Pulse (!) 118   Temp 99.7 °F (37.6 °C) (Tympanic)   Resp 18   SpO2 100%        Physical Exam     Physical Exam  Vitals and nursing note reviewed.   Constitutional:       Appearance: Normal appearance. She is well-developed.   HENT:      Head: Normocephalic and atraumatic.      Right Ear: Hearing normal.      Left Ear: Hearing normal.      Nose: Nose normal.      Mouth/Throat:      Lips: Pink.      Mouth: Mucous membranes are moist.      Pharynx: Oropharynx is clear.   Eyes:      General: Lids are normal.      Conjunctiva/sclera: Conjunctivae normal.      Pupils: Pupils are equal, round, and reactive to light.   Cardiovascular:      Rate and Rhythm: Normal rate and regular rhythm.      Heart sounds: Normal heart sounds, S1 normal and S2 normal.   Pulmonary:      Effort: Pulmonary effort is normal.      Breath sounds: Normal breath sounds.   Abdominal:      General:  Abdomen is flat. Bowel sounds are normal.      Palpations: Abdomen is soft.   Musculoskeletal:      Cervical back: Full passive range of motion without pain, normal range of motion and neck supple.      Right foot: Decreased range of motion. Normal capillary refill. Tenderness and bony tenderness present. No swelling, deformity, bunion, Charcot foot, foot drop, prominent metatarsal heads, laceration or crepitus. Normal pulse.   Skin:     General: Skin is warm and dry.   Neurological:      General: No focal deficit present.      Mental Status: She is alert and oriented to person, place, and time.   Psychiatric:         Mood and Affect: Mood is anxious. Affect is flat and tearful.         Speech: Speech normal.         Behavior: Behavior is agitated.         Thought Content: Thought content is not paranoid. Thought content includes suicidal ideation. Thought content does not include homicidal ideation.         Judgment: Judgment is impulsive.

## 2024-10-22 NOTE — TELEPHONE ENCOUNTER
It is hard to say without evaluating her in person, but I would say if pain does not improve within a week, she should have a follow up appointment. I would say driving restrictions are based on her pain level - if it hurts too much to use pedals, should not be driving.

## 2024-10-22 NOTE — TELEPHONE ENCOUNTER
Patient contacted the office back this morning. States that she doesn't feel she needs to schedule an appt at this time, did relay there was available appts today for her to be seen. Her right foot is  and hard to put weight on it. Is asking for further instruction, on the time frame she should expect for healing and at what point should she come into the office if not feeling better. She has been using lidocaine patches, aleve prn, states she can't ice the area due to the pressure causing discomfort. Also, when she had went to the  they did not inform her if she had an driving restrictions, she is assuming it is just going by her pain level but she wanted to be sure. Please advise.

## 2024-10-31 ENCOUNTER — CLINICAL SUPPORT (OUTPATIENT)
Dept: FAMILY MEDICINE CLINIC | Facility: CLINIC | Age: 34
End: 2024-10-31
Payer: COMMERCIAL

## 2024-10-31 DIAGNOSIS — Z30.42 ON DEPO-PROVERA FOR CONTRACEPTION: Primary | ICD-10-CM

## 2024-10-31 LAB — SL AMB POCT URINE HCG: NORMAL

## 2024-10-31 PROCEDURE — 96372 THER/PROPH/DIAG INJ SC/IM: CPT

## 2024-10-31 PROCEDURE — 81025 URINE PREGNANCY TEST: CPT

## 2024-10-31 RX ORDER — MEDROXYPROGESTERONE ACETATE 150 MG/ML
150 INJECTION, SUSPENSION INTRAMUSCULAR ONCE
Status: COMPLETED | OUTPATIENT
Start: 2024-10-31 | End: 2024-10-31

## 2024-10-31 RX ADMIN — MEDROXYPROGESTERONE ACETATE 150 MG: 150 INJECTION, SUSPENSION INTRAMUSCULAR at 16:55

## 2024-11-08 ENCOUNTER — PROCEDURE VISIT (OUTPATIENT)
Dept: GYNECOLOGY | Facility: CLINIC | Age: 34
End: 2024-11-08
Payer: COMMERCIAL

## 2024-11-08 VITALS — BODY MASS INDEX: 22.5 KG/M2 | WEIGHT: 123 LBS

## 2024-11-08 DIAGNOSIS — B97.7 HPV IN FEMALE: ICD-10-CM

## 2024-11-08 DIAGNOSIS — Z01.818 PRE-PROCEDURAL EXAMINATION: Primary | ICD-10-CM

## 2024-11-08 LAB — SL AMB POCT URINE HCG: NORMAL

## 2024-11-08 PROCEDURE — 81025 URINE PREGNANCY TEST: CPT | Performed by: OBSTETRICS & GYNECOLOGY

## 2024-11-08 PROCEDURE — 57452 EXAM OF CERVIX W/SCOPE: CPT | Performed by: OBSTETRICS & GYNECOLOGY

## 2024-11-08 NOTE — PROGRESS NOTES
Colposcopy    Date/Time: 11/8/2024 2:00 PM    Performed by: VIOLETA Blake  Authorized by: VIOLETA Blake    Other Assisting Provider: No    Verbal consent obtained?: Yes    Written consent obtained?: Yes    Risks and benefits: Risks, benefits and alternatives were discussed (infection, bleeding, pain, injury to surrounding structures, insufficient sampling)    Time Out:     Time out: Immediately prior to the procedure a time out was called      Time out performed at:  11/8/2024 2:01 PM  Patient states understanding of procedure being performed: Yes    Patient's understanding of procedure matches consent: Yes    Procedure consent matches procedure scheduled: Yes    Test results available and properly labeled: Yes    Required items: Required blood products, implants, devices and special equipment available    Patient identity confirmed:  Verbally with patient  Indication:     Indications: normal pap, +HPV, -16, -18.  Procedure:     Procedure: Colposcopy only      Under satisfactory analgesia the patient was prepped and draped in the dorsal lithotomy position: yes      [place] speculum was placed in the vagina: yes      Cervix was cleansed with betadine: yes      Under colposcopic examination the transition zone was seen in entirety: yes    Post-procedure:     Findings comment:  Pink epithelium    Patient tolerance of procedure:  Tolerated well, no immediate complications  Comments:      Will repeat pap in 6 months

## 2024-11-14 ENCOUNTER — OFFICE VISIT (OUTPATIENT)
Dept: NEUROLOGY | Facility: CLINIC | Age: 34
End: 2024-11-14
Payer: COMMERCIAL

## 2024-11-14 VITALS
OXYGEN SATURATION: 99 % | DIASTOLIC BLOOD PRESSURE: 68 MMHG | TEMPERATURE: 97.8 F | WEIGHT: 127.6 LBS | HEART RATE: 72 BPM | SYSTOLIC BLOOD PRESSURE: 112 MMHG | BODY MASS INDEX: 23.48 KG/M2 | HEIGHT: 62 IN

## 2024-11-14 DIAGNOSIS — R06.83 SNORING: ICD-10-CM

## 2024-11-14 DIAGNOSIS — R51.9 FREQUENT HEADACHES: ICD-10-CM

## 2024-11-14 DIAGNOSIS — R40.0 DAYTIME SLEEPINESS: Primary | ICD-10-CM

## 2024-11-14 PROCEDURE — 99204 OFFICE O/P NEW MOD 45 MIN: CPT | Performed by: NURSE PRACTITIONER

## 2024-11-14 NOTE — PROGRESS NOTES
Neurology Ambulatory Visit  Name: Samia Bull       : 1990       MRN: [id number]   Encounter Provider: Jerry Sarabia MA   Encounter Date: 2024  Encounter department: [facility] [place]    Assessment and Plan  1. Daytime sleepiness  -     Ambulatory Referral to Sleep Medicine; Future  2. Frequent headaches  Comments:  am usually but can happen throughout the day  Orders:  -     Ambulatory Referral to Neurology  -     MRI brain with and without contrast; Future; Expected date: 2024  3. Snoring  -     Ambulatory Referral to Sleep Medicine; Future    Patient Instructions/Plan:  Restart b2 vitamin for prevention  We may consider use of cefaly or nerivio device   MRI brain repeat and sleep study  Follow up in 3 months time.    She will Return in about 3 months (around 2025).    History of Present Illness     HPI   Samia Bull is a 34 y.o. female with past medical history of drug, tobacco and alcohol abuse (stopped in ), episode of syncope and ? PEA arrest in  with use of loop recorder since and no repeat episodes), appendectomy, eating disorder, anxiety who presents for new patient evaluation of headache, referred by primary care. She states family history of stroke in her paternal grandfather and aneurysm in her maternal aunt. She states headaches started after she quit alcohol use in  and have worsened since . She states every morning she wakes with a headache and she does describe sleeping difficulty- tosses/turns, snores, and grinds teeth and she does have daytime fatigue. She states headaches are frontal/temporal and described as throbbing/pressure and associated with lightheadedness, concentration difficulty, photo/phono/osmophobia. Weather changes, fatigue, caffeine and nicotine could be triggers-she no longer uses these. She states she hydrates well and is active-states exercise helps the headaches. She uses blue light lenses at work;  she lives with her partner and she is on depo shot for pregnancy prevention but may switch to nexplanon (actually questions if this has something to do with headaches-concerned about meningioma lawsuit depo has). She denies having a recent eye exam. 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 She does use medical marijuana at times; previously had been on celexa and trazodone; she is not looking for preventative therapy at this time; she has not been using over the counter medication and she states she used to abuse magnesium supplements so she would rather not restart these.    MRI brain without 9/16/2022;  Impression:  No brain abnormality seen. Given patient's history of new onset seizure,   contrast-enhanced MRI examination is suggested for further evaluation.     Review of Systems   Constitutional: Negative.    HENT: Negative.     Eyes: Negative.    Respiratory: Negative.     Cardiovascular: Negative.    Gastrointestinal: Negative.    Endocrine: Negative.    Genitourinary: Negative.    Musculoskeletal: Negative.    Skin: Negative.    Allergic/Immunologic: Negative.    Neurological:  Positive for dizziness, seizures (seizure - like activity 2 years ago), light-headedness, numbness (right thigh - pinched nerve) and headaches.   Hematological: Negative.    Psychiatric/Behavioral: Negative.     Sensitivity to light, noise and strong smells  After getting sober in 2018 pt feels that when the migraines started. After 2022 after syncope episode migraines started getting stronger.  ROS was reviewed and updated as appropriate    Current Outpatient Medications on File Prior to Visit   Medication Sig Dispense Refill    medroxyPROGESTERone (Depo-Provera) 150 mg/mL injection Inject 1 mL (150 mg total) into a muscle every 3 (three) months 1 mL 0    valACYclovir (VALTREX) 500 mg tablet Take 500 mg by mouth as needed       No current facility-administered  "medications on file prior to visit.      Social History     Tobacco Use    Smoking status: Former     Current packs/day: 0.00     Average packs/day: 0.3 packs/day for 15.0 years (3.8 ttl pk-yrs)     Types: Cigarettes     Quit date:      Years since quittin.8    Smokeless tobacco: Never   Vaping Use    Vaping status: Every Day    Substances: THC, CBD   Substance and Sexual Activity    Alcohol use: No    Drug use: Yes     Types: Marijuana     Comment: Medical marijuana    Sexual activity: Yes     Partners: Male     Birth control/protection: Injection     Comment: In [employer] uses birth control       Objective     /68 (BP Location: Right arm, Patient Position: Sitting, Cuff Size: Standard)   Pulse 72   Temp 97.8 °F (36.6 °C) (Temporal)   Ht 5' 2\" (1.575 m)   Wt 57.9 kg (127 lb 9.6 oz)   SpO2 99%   BMI 23.34 kg/m²    Physical Exam  Vitals reviewed.   Constitutional:       General: She is not in acute distress.  HENT:      Head: Normocephalic and atraumatic.      Right Ear: External ear normal.      Left Ear: External ear normal.      Nose: Nose normal.      Mouth/Throat:      Mouth: Mucous membranes are moist.      Pharynx: Oropharynx is clear.      Comments: Crowded oropharynx  Eyes:      General: Lids are normal.         Right eye: No discharge.         Left eye: No discharge.      Extraocular Movements: Extraocular movements intact.      Conjunctiva/sclera: Conjunctivae normal.      Pupils: Pupils are equal, round, and reactive to light.   Cardiovascular:      Rate and Rhythm: Normal rate and regular rhythm.      Pulses: Normal pulses.      Heart sounds: Normal heart sounds.   Pulmonary:      Effort: Pulmonary effort is normal.      Breath sounds: Normal breath sounds.   Abdominal:      General: There is no distension.      Tenderness: There is no abdominal tenderness.   Musculoskeletal:      Cervical back: Normal range of motion.      Right lower leg: No edema.      Left lower leg: No edema. "   Skin:     General: Skin is warm and dry.      Capillary Refill: Capillary refill takes less than 2 seconds.      Findings: No rash.   Neurological:      General: No focal deficit present.      Mental Status: Mental status is at baseline.      Motor: Motor strength is normal.     Coordination: Romberg sign negative.      Deep Tendon Reflexes:      Reflex Scores:       Brachioradialis reflexes are 2+ on the right side and 2+ on the left side.       Patellar reflexes are 2+ on the right side and 2+ on the left side.  Psychiatric:         Mood and Affect: Mood normal.         Speech: Speech normal.         Behavior: Behavior normal.       Neurological Exam  Mental Status  Awake, alert and oriented to person, place and time. Oriented to person, place and time. Speech is normal. Language is fluent with no aphasia. Fund of knowledge is appropriate for level of education.    Cranial Nerves  CN II: Visual acuity is normal. Visual fields full to confrontation. Right funduscopic exam: disc intact. Left funduscopic exam: disc intact.  CN III, IV, VI: Extraocular movements intact bilaterally. Normal lids and orbits bilaterally. Pupils equal round and reactive to light bilaterally.  CN V: Facial sensation is normal.  CN VII: Full and symmetric facial movement.  CN VIII: Hearing is normal.  CN IX, X: Palate elevates symmetrically. Normal gag reflex.  CN XI: Shoulder shrug strength is normal.  CN XII: Tongue midline without atrophy or fasciculations.    Motor  Normal muscle bulk throughout. Strength is 5/5 throughout all four extremities.    Sensory  Light touch is normal in upper and lower extremities.     Reflexes                                            Right                      Left  Brachioradialis                    2+                         2+  Patellar                                2+                         2+    Coordination  Right: Finger-to-nose normal. Rapid alternating movement normal.Left: Finger-to-nose  normal. Rapid alternating movement normal.    Gait  Casual gait is normal including stance, stride, and arm swing. Normal tandem gait. Romberg is absent. Able to rise from chair without using arms.

## 2024-11-14 NOTE — PATIENT INSTRUCTIONS
Restart b2 vitamin for prevention  We may consider use of cefaly or nerivio device   MRI brain repeat and sleep study  Follow up in 3 months time.

## 2024-11-21 ENCOUNTER — OFFICE VISIT (OUTPATIENT)
Dept: CARDIOLOGY CLINIC | Facility: CLINIC | Age: 34
End: 2024-11-21
Payer: COMMERCIAL

## 2024-11-21 VITALS
SYSTOLIC BLOOD PRESSURE: 108 MMHG | DIASTOLIC BLOOD PRESSURE: 80 MMHG | HEIGHT: 62 IN | WEIGHT: 127.8 LBS | HEART RATE: 62 BPM | BODY MASS INDEX: 23.52 KG/M2

## 2024-11-21 DIAGNOSIS — R55 SYNCOPE AND COLLAPSE: Primary | ICD-10-CM

## 2024-11-21 PROCEDURE — 99214 OFFICE O/P EST MOD 30 MIN: CPT

## 2024-11-21 NOTE — PROGRESS NOTES
Cardiology   MD Harley Polk MD, FACC  Henry Parisi DO, [facility] FACP, FASNC Ather Mansoor, MD Rujul Patel, DO, [facility]  Junaid Puentes DO, [facility]  -------------------------------------------------------------------  [facility]  [address] [place] [address]  Phone: [phone]  Fax: [phone]  11/21/24  Samia Bull  YOB: 1990   MRN: [id number]      Referring Physician: No referring provider defined for this encounter.     HPI: Samia Bull is a 34 y.o. female with:   Syncope - unexplained - loop in place - boston sci device - negative for arrhythmia x 2 years  Recovering etoh abuse  Former tobacco use  Uses medical marijuana  Depression  Anxiety  Eating d/o    She presents today for follow-up.  No new episode syncope but still with lightheadedness and chronic migraines.  Just saw neurology, going to get MRI of the brain because of this.  Loop recorder has been negative for significant arrhythmia or heart block    Review of Systems   Constitutional:  Negative for chills and fever.   HENT:  Negative for facial swelling and sore throat.    Eyes:  Negative for visual disturbance.   Respiratory:  Negative for cough, chest tightness, shortness of breath and wheezing.    Cardiovascular:  Negative for chest pain, palpitations and leg swelling.   Gastrointestinal:  Negative for abdominal pain, blood in stool, constipation, diarrhea, nausea and vomiting.   Endocrine: Negative for cold intolerance and heat intolerance.   Genitourinary:  Negative for decreased urine volume, difficulty urinating, dysuria and hematuria.   Musculoskeletal:  Negative for arthralgias, back pain and myalgias.   Skin:  Negative for rash.   Neurological:  Negative for dizziness, syncope, weakness and numbness.   Psychiatric/Behavioral:  Negative for agitation, behavioral problems and confusion. The patient is not nervous/anxious.         OBJECTIVE  Vitals:     11/21/24 1632   BP: 108/80   Pulse: 62       Physical Exam  Constitutional: awake, alert and oriented, in no acute distress, no obvious deformities  Head: Normocephalic, without obvious abnormality, atraumatic  Eyes: conjunctivae clear and moist. Sclera anicteric.  No xanthelasmas. Pupils equal bilaterally.  Extraocular motions are full.  Ear nose mouth and throat: ears are symmetrical bilaterally, hearing appears to be equal bilaterally, no nasal discharge or epistaxis, oropharynx is clear with moist mucous membranes  Neck:  Trachea is midline, neck is supple, no thyromegaly or significant lymphadenopathy, there is full range of motion.  Lungs: clear to auscultation bilaterally, no wheezes, no rales, no rhonchi, no accessory muscle use, breathing is nonlabored  Heart: Regular rhythm with a Normal heart rate, S1, S2 normal, No Murmur, no click, rub or gallop, No lower extremity edema  Abdomen: soft, non-tender; bowel sounds normal; no masses,  no organomegaly  Psychiatric:  Patient is oriented to time, place, person, mood/affect is negative for depression, anxiety, agitation, appears to have appropriate insight  Skin: Skin is warm, dry, intact. No obvious rashes or lesions on exposed extremities.  Nail beds are pink with no cyanosis or clubbing.    EKG:  No results found for this visit on 11/21/24.     IMPRESSION:  Syncope - unexplained - loop in place - boston sci device - negative for arrhythmia x 2 years  Recovering etoh abuse  Former tobacco use  Uses medical marijuana  Depression  Anxiety  Eating d/o    DISCUSSION/RECOMMENDATIONS:  Loop recorder remains negative  Will continue to check this every 3 months  No new episodes of syncope  Recommendations remain the same regarding water intake, would target at least 2 L of water intake per day along with increased salt intake  Continue to exercise, spoke to her about heart healthy diet as well today  Will plan to check in with her every 6 months while loop is  active and plan for explantation when battery depleted or if patient requests otherwise    Junaid Puentes DO, FACC, FASNC  --------------------------------------------------------------------------------  TREADMILL STRESS  No results found for this or any previous visit.     ----------------------------------------------------------------------------------------------  NUCLEAR STRESS TEST: No results found for this or any previous visit.    No results found for this or any previous visit.      --------------------------------------------------------------------------------  CATH:  No results found for this or any previous visit.    --------------------------------------------------------------------------------  ECHO:   No results found for this or any previous visit.    No results found for this or any previous visit.    --------------------------------------------------------------------------------  HOLTER  No results found for this or any previous visit.    No results found for this or any previous visit.    --------------------------------------------------------------------------------  CAROTIDS  No results found for this or any previous visit.     --------------------------------------------------------------------------------  Diagnoses and all orders for this visit:    Syncope and collapse       ======================================================    Past Medical History:   Diagnosis Date    Anxiety     Asthma     Depression     Eating disorder     GERD (gastroesophageal reflux disease)     Headache(784.0)     Herpes simplex infection     Infectious viral hepatitis     Palpitations     Psychiatric disorder     Sciatica     Substance abuse (HCC)     Urinary tract infection      Past Surgical History:   Procedure Laterality Date    ABDOMINAL SURGERY      APPENDECTOMY      APPENDECTOMY LAPAROSCOPIC N/A 6/5/2024    Procedure: APPENDECTOMY LAPAROSCOPIC;  Surgeon: Ignacia Vaughan MD;  Location: [facility] OR;   Service: General    WISDOM TOOTH EXTRACTION           Medications  Current Outpatient Medications   Medication Sig Dispense Refill    medroxyPROGESTERone (Depo-Provera) 150 mg/mL injection Inject 1 mL (150 mg total) into a muscle every 3 (three) months 1 mL 0    valACYclovir (VALTREX) 500 mg tablet Take 500 mg by mouth as needed       No current facility-administered medications for this visit.        Allergies   Allergen Reactions    Lactose - Food Allergy Diarrhea    Latex Itching and Rash     Burning and itching     Petrolatum Itching    White Petrolatum Rash       Social History     Socioeconomic History    Marital status: Single     Spouse name: Not on file    Number of children: Not on file    Years of education: Not on file    Highest education level: Not on file   Occupational History    Not on file   Tobacco Use    Smoking status: Former     Current packs/day: 0.00     Average packs/day: 0.3 packs/day for 15.0 years (3.8 ttl pk-yrs)     Types: Cigarettes     Quit date:      Years since quittin.9    Smokeless tobacco: Never   Vaping Use    Vaping status: Every Day    Substances: THC, CBD   Substance and Sexual Activity    Alcohol use: No    Drug use: Yes     Types: Marijuana     Comment: Medical marijuana    Sexual activity: Yes     Partners: Male     Birth control/protection: Injection     Comment: In [employer] uses birth control   Other Topics Concern    Not on file   Social History Narrative    Not on file     Social Drivers of Health     Financial Resource Strain: Not on file   Food Insecurity: No Food Insecurity (2024)    Nursing - Inadequate Food Risk Classification     Worried About Running Out of Food in the Last Year: Never true     Ran Out of Food in the Last Year: Never true     Ran Out of Food in the Last Year: Not on file   Transportation Needs: No Transportation Needs (2024)    PRAPARE - Transportation     Lack of Transportation (Medical): No     Lack of Transportation  "(Non-Medical): No   Physical Activity: Not on file   Stress: Not on file   Social Connections: Not on file   Intimate Partner Violence: Not on file   Housing Stability: Low Risk  (6/5/2024)    Housing Stability Vital Sign     Unable to Pay for Housing in the Last Year: No     Number of Times Moved in the Last Year: 1     Homeless in the Last Year: No        Family History   Problem Relation Age of Onset    Hypertension Mother     Arthritis Mother        Lab Results   Component Value Date    WBC 9.66 06/05/2024    HGB 14.2 06/05/2024    HCT 43.8 06/05/2024    MCV 86 06/05/2024     06/05/2024      Lab Results   Component Value Date    SODIUM 138 06/05/2024    K 3.9 06/05/2024     06/05/2024    CO2 25 06/05/2024    BUN 14 06/05/2024    CREATININE 0.76 06/05/2024    GLUC 100 06/05/2024    CALCIUM 9.3 06/05/2024      Lab Results   Component Value Date    HGBA1C 5.5 05/18/2024      No results found for: \"CHOL\"  Lab Results   Component Value Date    HDL 68 05/18/2024     Lab Results   Component Value Date    LDLCALC 88 05/18/2024     Lab Results   Component Value Date    TRIG 40 05/18/2024     No results found for: \"CHOLHDL\"   No results found for: \"INR\", \"PROTIME\"       Patient Active Problem List    Diagnosis Date Noted    Acute appendicitis with localized peritonitis, without perforation, abscess, or gangrene 06/05/2024    Anogenital human papilloma virus (HPV) infection 12/06/2023    Palpitations 10/06/2022    Chronic daily headache 10/04/2022    Seizure-like activity (HCC) 09/16/2022    Syncope and collapse 09/16/2022    Herpes 11/11/2021    PTSD (post-traumatic stress disorder) 11/11/2021    Polyarthralgia 06/02/2021    Anxiety 07/13/2020    Eating disorder 11/01/2018    History of suicidal ideation 11/01/2018    Asthma 06/01/2018    Mood disorder of depressed type 06/01/2018    On Depo-Provera for contraception 10/06/2017    Alcohol dependence, episodic drinking behavior (HCC) 04/12/2017    Recurrent " "major depressive disorder, in partial remission (HCC) 11/03/2014       Portions of the record may have been created with voice recognition software. Occasional wrong word or \"sound a like\" substitutions may have occurred due to the inherent limitations of voice recognition software. Read the chart carefully and recognize, using context, where substitutions have occurred.    Junaid Puentes DO, [facility]  11/21/2024 5:05 PM          "

## 2024-11-27 ENCOUNTER — TRANSCRIBE ORDERS (OUTPATIENT)
Dept: SLEEP CENTER | Facility: CLINIC | Age: 34
End: 2024-11-27

## 2024-11-27 DIAGNOSIS — R40.0 DAYTIME SLEEPINESS: Primary | ICD-10-CM

## 2024-11-27 DIAGNOSIS — R06.83 SNORING: ICD-10-CM

## 2024-12-05 ENCOUNTER — TELEMEDICINE (OUTPATIENT)
Dept: FAMILY MEDICINE CLINIC | Facility: CLINIC | Age: 34
End: 2024-12-05
Payer: COMMERCIAL

## 2024-12-05 ENCOUNTER — REMOTE DEVICE CLINIC VISIT (OUTPATIENT)
Dept: CARDIOLOGY CLINIC | Facility: CLINIC | Age: 34
End: 2024-12-05
Payer: COMMERCIAL

## 2024-12-05 DIAGNOSIS — G47.9 SLEEP DIFFICULTIES: ICD-10-CM

## 2024-12-05 DIAGNOSIS — R55 SYNCOPE AND COLLAPSE: Primary | ICD-10-CM

## 2024-12-05 DIAGNOSIS — F41.9 ANXIETY: Primary | ICD-10-CM

## 2024-12-05 PROCEDURE — 93298 REM INTERROG DEV EVAL SCRMS: CPT | Performed by: INTERNAL MEDICINE

## 2024-12-05 PROCEDURE — 99213 OFFICE O/P EST LOW 20 MIN: CPT

## 2024-12-05 RX ORDER — HYDROXYZINE HYDROCHLORIDE 25 MG/1
25 TABLET, FILM COATED ORAL EVERY 6 HOURS PRN
Qty: 60 TABLET | Refills: 0 | Status: SHIPPED | OUTPATIENT
Start: 2024-12-05

## 2024-12-05 NOTE — PROGRESS NOTES
Virtual Regular Visit  Name: Samia Bull      : 1990      MRN: [id number]  Encounter Provider: Elizabeth Shoemaker PA-C  Encounter Date: 2024   Encounter department: [facility]      Verification of patient location:  Patient is located at Home in the following state in which I hold an active license [place] :  Assessment & Plan  Anxiety  Patient concerned today about sleep issues after recently starting new masters degree program.  Has not been sleeping well and has a hard time winding down around bedtime.  Will trial hydroxyzine as she has done well with this medication before.  We discussed this will not affect her sobriety as this medication is not addictive.  Discussed if it is used for many years excessively, there is possibility of tolerance but not addiction.  Patient counseled to take 1 about an hour before bedtime.  She may also choose to take it throughout the day if needed, she may take it up to 4 times a day.  Patient to let me know in about 2 or 3 weeks if this is working well for her, if so can provide her with a refill.  We discussed ADRs of medication as well, she will let us know if she has any issues with the hydroxyzine and would like to trial something else such as trazodone.  Orders:    hydrOXYzine HCL (ATARAX) 25 mg tablet; Take 1 tablet (25 mg total) by mouth every 6 (six) hours as needed for anxiety (sleep)    Sleep difficulties    Orders:    hydrOXYzine HCL (ATARAX) 25 mg tablet; Take 1 tablet (25 mg total) by mouth every 6 (six) hours as needed for anxiety (sleep)        Encounter provider Elizabeth Shoemaker PA-C    The patient was identified by name and date of birth. Samia Bull was informed that this is a telemedicine visit and that the visit is being conducted through the Epic Embedded platform. She agrees to proceed..  My office door was closed. No one else was in the room.  She acknowledged consent and understanding of privacy and security of  the video platform. The patient has agreed to participate and understands they can discontinue the visit at any time.    Patient is aware this is a billable service.     History of Present Illness     Patient presents today via virtual visit to discuss recent sleep issues with starting a new masters program for mental health counseling.  Reports she has had issues with relaxing at nighttime and sleeping which has resulted in poor concentration during the day due to lack of sleep.  Reports being on hydroxyzine in the past for sleep which helped greatly with her sleep and would like to trial this again.  Would like to avoid daily anxiety treatment at this time as she feels it affects her sleep more than anything and overall mental health is doing okay and is stable.      Review of Systems   Psychiatric/Behavioral:  Positive for dysphoric mood and sleep disturbance. Negative for behavioral problems, confusion, decreased concentration, self-injury and suicidal ideas. The patient is not nervous/anxious.        Objective   There were no vitals taken for this visit.    Physical Exam  Constitutional:       General: She is not in acute distress.     Appearance: Normal appearance. She is normal weight. She is not ill-appearing.   HENT:      Head: Normocephalic and atraumatic.   Pulmonary:      Effort: Pulmonary effort is normal. No respiratory distress.   Skin:     Coloration: Skin is not cyanotic or pale.   Neurological:      General: No focal deficit present.      Mental Status: She is alert and oriented to person, place, and time.   Psychiatric:         Mood and Affect: Mood normal.         Behavior: Behavior normal.         Judgment: Judgment normal.         Visit Time  Total Visit Duration: 15

## 2024-12-05 NOTE — ASSESSMENT & PLAN NOTE
Patient concerned today about sleep issues after recently starting new masters degree program.  Has not been sleeping well and has a hard time winding down around bedtime.  Will trial hydroxyzine as she has done well with this medication before.  We discussed this will not affect her sobriety as this medication is not addictive.  Discussed if it is used for many years excessively, there is possibility of tolerance but not addiction.  Patient counseled to take 1 about an hour before bedtime.  She may also choose to take it throughout the day if needed, she may take it up to 4 times a day.  Patient to let me know in about 2 or 3 weeks if this is working well for her, if so can provide her with a refill.  We discussed ADRs of medication as well, she will let us know if she has any issues with the hydroxyzine and would like to trial something else such as trazodone.  Orders:    hydrOXYzine HCL (ATARAX) 25 mg tablet; Take 1 tablet (25 mg total) by mouth every 6 (six) hours as needed for anxiety (sleep)

## 2024-12-05 NOTE — PROGRESS NOTES
"Results for orders placed or performed in visit on 12/05/24   Cardiac EP device report    Narrative    [employer]SCI LUX-dx ICM - ACTIVE SYSTEM IS MRI CONDITIONAL  1 red event alert  LATITUDE TRANSMISSION: LOOP: BATTERY STATUS \"OK.\" PRESENTING RHYTHM: NSR @ 88 BPM. 1 BRADYCARDIA EPISODE DETECTED ON 11/30/24 @ 1:32AM W/EGM SHOWING 9 SECS OF BRADYCARDIA @ 40 BPM. PT DOES NOT TAKE ANY BB. NORMAL DEVICE FUNCTION. CH        "

## 2024-12-06 ENCOUNTER — RESULTS FOLLOW-UP (OUTPATIENT)
Dept: CARDIOLOGY CLINIC | Facility: CLINIC | Age: 34
End: 2024-12-06

## 2024-12-09 ENCOUNTER — HOSPITAL ENCOUNTER (OUTPATIENT)
Facility: MEDICAL CENTER | Age: 34
Discharge: HOME | End: 2024-12-09
Payer: COMMERCIAL

## 2024-12-09 DIAGNOSIS — R51.9 FREQUENT HEADACHES: ICD-10-CM

## 2024-12-09 PROCEDURE — 70553 MRI BRAIN STEM W/O & W/DYE: CPT

## 2024-12-09 PROCEDURE — A9585 GADOBUTROL INJECTION: HCPCS | Performed by: NURSE PRACTITIONER

## 2024-12-09 RX ORDER — GADOBUTROL 604.72 MG/ML
5.8 INJECTION INTRAVENOUS
Status: COMPLETED | OUTPATIENT
Start: 2024-12-09 | End: 2024-12-09

## 2024-12-09 RX ADMIN — GADOBUTROL 5.8 ML: 604.72 INJECTION INTRAVENOUS at 16:15

## 2024-12-10 ENCOUNTER — RESULTS FOLLOW-UP (OUTPATIENT)
Dept: NEUROLOGY | Facility: CLINIC | Age: 34
End: 2024-12-10

## 2025-01-13 ENCOUNTER — TELEPHONE (OUTPATIENT)
Age: 35
End: 2025-01-13

## 2025-01-13 DIAGNOSIS — Z30.42 ON DEPO-PROVERA FOR CONTRACEPTION: ICD-10-CM

## 2025-01-13 RX ORDER — MEDROXYPROGESTERONE ACETATE 150 MG/ML
150 INJECTION, SUSPENSION INTRAMUSCULAR
Qty: 1 ML | Refills: 0 | Status: SHIPPED | OUTPATIENT
Start: 2025-01-13

## 2025-01-13 RX ORDER — MEDROXYPROGESTERONE ACETATE 150 MG/ML
150 INJECTION, SUSPENSION INTRAMUSCULAR
Qty: 1 ML | Refills: 0 | Status: CANCELLED | OUTPATIENT
Start: 2025-01-13

## 2025-01-13 NOTE — TELEPHONE ENCOUNTER
Patient called and scheduled her Depo shot.  She would like to know if we have the medication?  If not Can we send it to [facility] in [place] for her to grad prior top Fridays appt?  Pls call and advise.  Its ok to [facility] [phone]

## 2025-01-13 NOTE — TELEPHONE ENCOUNTER
Patient called to have her nurse appointment rescheduled for 01/17/2025. Patient stated she had accidentally cancelled the appointment. Patient has been placed back in the schedule. Patient had also asked if her message had been sent in regards to her Depo medication. Patient advised message had been sent. Patient is asking for a return call to discuss where her medication will be. Please advise.

## 2025-01-17 ENCOUNTER — CLINICAL SUPPORT (OUTPATIENT)
Dept: FAMILY MEDICINE CLINIC | Facility: CLINIC | Age: 35
End: 2025-01-17
Payer: COMMERCIAL

## 2025-01-17 DIAGNOSIS — Z30.42 ON DEPO-PROVERA FOR CONTRACEPTION: ICD-10-CM

## 2025-01-17 PROCEDURE — 96372 THER/PROPH/DIAG INJ SC/IM: CPT

## 2025-01-17 RX ORDER — MEDROXYPROGESTERONE ACETATE 150 MG/ML
150 INJECTION, SUSPENSION INTRAMUSCULAR
Qty: 1 ML | Refills: 0 | Status: CANCELLED | OUTPATIENT
Start: 2025-01-17

## 2025-01-17 RX ORDER — MEDROXYPROGESTERONE ACETATE 150 MG/ML
150 INJECTION, SUSPENSION INTRAMUSCULAR ONCE
Status: COMPLETED | OUTPATIENT
Start: 2025-01-17 | End: 2025-01-17

## 2025-01-17 RX ADMIN — MEDROXYPROGESTERONE ACETATE 150 MG: 150 INJECTION, SUSPENSION INTRAMUSCULAR at 14:49

## 2025-01-31 DIAGNOSIS — Z30.42 ON DEPO-PROVERA FOR CONTRACEPTION: ICD-10-CM

## 2025-01-31 RX ORDER — MEDROXYPROGESTERONE ACETATE 150 MG/ML
150 INJECTION, SUSPENSION INTRAMUSCULAR
Qty: 1 ML | Refills: 3 | Status: SHIPPED | OUTPATIENT
Start: 2025-01-31

## 2025-01-31 NOTE — TELEPHONE ENCOUNTER
Pt was having depo filled and administered with pcp office. She would like to have you fill her depo from this point on and have it administered here since this is her GYN. [facility] mail [facility] is preferred pharmacy

## 2025-02-28 ENCOUNTER — OFFICE VISIT (OUTPATIENT)
Dept: URGENT CARE | Facility: CLINIC | Age: 35
End: 2025-02-28
Payer: COMMERCIAL

## 2025-02-28 VITALS
HEART RATE: 64 BPM | RESPIRATION RATE: 18 BRPM | TEMPERATURE: 98.5 F | SYSTOLIC BLOOD PRESSURE: 138 MMHG | OXYGEN SATURATION: 99 % | DIASTOLIC BLOOD PRESSURE: 90 MMHG

## 2025-02-28 DIAGNOSIS — R12 HEARTBURN: Primary | ICD-10-CM

## 2025-02-28 DIAGNOSIS — R13.10 DYSPHAGIA, UNSPECIFIED TYPE: ICD-10-CM

## 2025-02-28 PROCEDURE — 99213 OFFICE O/P EST LOW 20 MIN: CPT | Performed by: PHYSICIAN ASSISTANT

## 2025-02-28 RX ORDER — OMEPRAZOLE 20 MG/1
20 CAPSULE, DELAYED RELEASE ORAL DAILY
Qty: 14 CAPSULE | Refills: 0 | Status: SHIPPED | OUTPATIENT
Start: 2025-02-28

## 2025-02-28 NOTE — PATIENT INSTRUCTIONS
Start omeprazole and take as instructed.  If it is not covered under your insurance, you may get this over-the-counter.    Referral placed for gastroenterology.  You are under no obligation to make appointment.  You may want to discuss this with your primary care provider first.  If you want to go that direction I would recommend that you contact your primary care office as soon as possible to arrange follow-up for approximately 10 to 14 days for recheck.    If you would start to vomit any blood, pass dark tarry stools or isis blood in stools, any unusual weakness, chest pain or shortness of breath please proceed to emergency room for further evaluation.

## 2025-02-28 NOTE — PROGRESS NOTES
[facility]    NAME: Samia Bull is a 35 y.o. female  : 1990    MRN: [id number]  DATE: 2025  TIME: 6:28 PM    Assessment and Plan   Heartburn [R12]  1. Heartburn  omeprazole (PriLOSEC) 20 mg delayed release capsule    Ambulatory Referral to Gastroenterology      2. Dysphagia, unspecified type  omeprazole (PriLOSEC) 20 mg delayed release capsule    Ambulatory Referral to Gastroenterology          Patient Instructions     Patient Instructions   Start omeprazole and take as instructed.  If it is not covered under your insurance, you may get this over-the-counter.    Referral placed for gastroenterology.  You are under no obligation to make appointment.  You may want to discuss this with your primary care provider first.  If you want to go that direction I would recommend that you contact your primary care office as soon as possible to arrange follow-up for approximately 10 to 14 days for recheck.    If you would start to vomit any blood, pass dark tarry stools or isis blood in stools, any unusual weakness, chest pain or shortness of breath please proceed to emergency room for further evaluation.    Chief Complaint     Chief Complaint   Patient presents with    Heartburn     Patient has an exascerbation of her acid reflux that started on  or Monday and has not been able to tame it. She has changed her diet and has not experienced much relief       History of Present Illness   Samia Bull presents to the clinic c/o  35-year-old patient comes in complaining of acid reflux.    Started: Patient reports that she has history of acid reflux but she had a flareup that she has not been on to get settle down starting on  or Monday.  Associated signs and symptoms: Some fatigue but no fever or chills.  No blood in vomit.  Has vomited a couple times.  No blood in stools or dark tarry stools.  Feels like there is a bump in her throat and when she eats or drinks things feel stuck.   Denies any abnormal weight loss.  Modifying factors: She did take some Tums and that gets some relief initially but feels worse afterwards.  Took 1 old omeprazole and thought that that maybe helped.    Patient states that back in 2022 she had really bad heartburn indigestion and actually started to vomit blood.  She was seen in the emergency room.  She followed with primary care.  Has not followed with gastroenterology and never has had any scoping done.    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        Review of Systems   Review of Systems   Constitutional:  Positive for appetite change. Negative for activity change, chills, fatigue and fever.   Respiratory: Negative.     Cardiovascular:  Positive for chest pain. Negative for palpitations and leg swelling.        Pain radiates from epigastric area up into neck.   Gastrointestinal:  Negative for abdominal distention, abdominal pain, anal bleeding, blood in stool, constipation, diarrhea, nausea and vomiting.       Current Medications     Long-Term Medications   Medication Sig Dispense Refill    hydrOXYzine HCL (ATARAX) 25 mg tablet Take 1 tablet (25 mg total) by mouth every 6 (six) hours as needed for anxiety (sleep) 60 tablet 0    medroxyPROGESTERone (Depo-Provera) 150 mg/mL injection Inject 1 mL (150 mg total) into a muscle every 3 (three) months 1 mL 3    omeprazole (PriLOSEC) 20 mg delayed release capsule Take 1 capsule (20 mg total) by mouth daily 14 capsule 0    valACYclovir (VALTREX) 500 mg tablet Take 500 mg by mouth as needed         Current Allergies     Allergies as of 02/28/2025 - Reviewed 02/28/2025   Allergen Reaction Noted    Lactose - food allergy Diarrhea 12/02/2010    Latex Itching and Rash 09/24/2024    Petrolatum Itching 02/22/2013    White petrolatum Rash 06/18/2015          The following portions of the patient's history were reviewed and updated as appropriate: allergies,  current medications, past family history, past medical history, past social history, past surgical history and problem list.  Past Medical History:   Diagnosis Date    Anxiety     Asthma     Depression     Eating disorder     GERD (gastroesophageal reflux disease)     Headache(784.0)     Herpes simplex infection     Infectious viral hepatitis     Palpitations     Psychiatric disorder     Sciatica     Substance abuse (HCC)     Urinary tract infection      Past Surgical History:   Procedure Laterality Date    ABDOMINAL SURGERY      APPENDECTOMY      APPENDECTOMY LAPAROSCOPIC N/A 6/5/2024    Procedure: APPENDECTOMY LAPAROSCOPIC;  Surgeon: Ignacia Vaughan MD;  Location: [facility];  Service: General    WISDOM TOOTH EXTRACTION       Family History   Problem Relation Age of Onset    Hypertension Mother     Arthritis Mother        Objective   /90   Pulse 64   Temp 98.5 °F (36.9 °C)   Resp 18   SpO2 99%   No LMP recorded.       Physical Exam     Physical Exam  Vitals and nursing note reviewed.   Constitutional:       General: She is not in acute distress.     Appearance: Normal appearance. She is not ill-appearing, toxic-appearing or diaphoretic.   HENT:      Head: Normocephalic and atraumatic.      Mouth/Throat:      Mouth: Mucous membranes are moist.      Pharynx: No oropharyngeal exudate or posterior oropharyngeal erythema.   Eyes:      General: No scleral icterus.     Extraocular Movements: Extraocular movements intact.      Conjunctiva/sclera: Conjunctivae normal.      Pupils: Pupils are equal, round, and reactive to light.   Cardiovascular:      Rate and Rhythm: Normal rate and regular rhythm.      Heart sounds: Normal heart sounds. No murmur heard.     No friction rub. No gallop.   Pulmonary:      Effort: Pulmonary effort is normal. No respiratory distress.      Breath sounds: Normal breath sounds. No stridor. No wheezing, rhonchi or rales.   Abdominal:      General: Bowel sounds are normal.       Palpations: Abdomen is soft. There is no hepatomegaly or splenomegaly.      Tenderness: There is abdominal tenderness in the epigastric area. There is no guarding or rebound.   Musculoskeletal:      Cervical back: Normal range of motion and neck supple. No rigidity or tenderness.   Lymphadenopathy:      Cervical: No cervical adenopathy.   Skin:     General: Skin is warm and dry.      Coloration: Skin is not pale.   Neurological:      Mental Status: She is alert.   Psychiatric:         Mood and Affect: Mood normal.         Behavior: Behavior normal.

## 2025-03-04 DIAGNOSIS — G47.9 SLEEP DIFFICULTIES: ICD-10-CM

## 2025-03-04 DIAGNOSIS — F41.9 ANXIETY: Primary | ICD-10-CM

## 2025-03-04 DIAGNOSIS — F41.9 ANXIETY: ICD-10-CM

## 2025-03-04 RX ORDER — HYDROXYZINE HYDROCHLORIDE 10 MG/1
10 TABLET, FILM COATED ORAL EVERY 6 HOURS PRN
Qty: 30 TABLET | Refills: 0 | Status: SHIPPED | OUTPATIENT
Start: 2025-03-04

## 2025-03-05 ENCOUNTER — REMOTE DEVICE CLINIC VISIT (OUTPATIENT)
Dept: CARDIOLOGY CLINIC | Facility: CLINIC | Age: 35
End: 2025-03-05
Payer: COMMERCIAL

## 2025-03-05 DIAGNOSIS — Z95.818 IMPLANTABLE LOOP RECORDER PRESENT: ICD-10-CM

## 2025-03-05 DIAGNOSIS — R00.2 PALPITATIONS: Primary | ICD-10-CM

## 2025-03-05 DIAGNOSIS — R55 SYNCOPE AND COLLAPSE: ICD-10-CM

## 2025-03-05 PROCEDURE — 93298 REM INTERROG DEV EVAL SCRMS: CPT | Performed by: INTERNAL MEDICINE

## 2025-03-05 RX ORDER — HYDROXYZINE HYDROCHLORIDE 25 MG/1
25 TABLET, FILM COATED ORAL EVERY 6 HOURS PRN
Qty: 60 TABLET | Refills: 0 | OUTPATIENT
Start: 2025-03-05

## 2025-03-05 NOTE — PROGRESS NOTES
"BSCI LUX-dx ICM - ACTIVE SYSTEM IS MRI CONDITIONAL   1 red event alert   LATITUDE TRANSMISSION/ LOOP: BATTERY STATUS \"OK.\" PRESENTING RHYTHM  NSR @ AVG 90 BPM. 1 UNREPORTED DEVICE DETECTED BRADYCARDIA EPISODE ON 3/3/2025 @ 03:34 A.M. - DURATION 6 SECS. @ AVG 39 BPM. NO PATIENT ACTIVATED EPISODES.HX: SAME & NO BBLOCKER. NORMAL DEVICE FUNCTION.   ES   "

## 2025-03-06 ENCOUNTER — RESULTS FOLLOW-UP (OUTPATIENT)
Dept: CARDIOLOGY CLINIC | Facility: CLINIC | Age: 35
End: 2025-03-06

## 2025-03-13 ENCOUNTER — CONSULT (OUTPATIENT)
Dept: GASTROENTEROLOGY | Facility: MEDICAL CENTER | Age: 35
End: 2025-03-13
Payer: COMMERCIAL

## 2025-03-13 VITALS
WEIGHT: 128.2 LBS | DIASTOLIC BLOOD PRESSURE: 79 MMHG | OXYGEN SATURATION: 98 % | SYSTOLIC BLOOD PRESSURE: 118 MMHG | HEIGHT: 62 IN | BODY MASS INDEX: 23.59 KG/M2 | HEART RATE: 67 BPM | TEMPERATURE: 98.4 F

## 2025-03-13 DIAGNOSIS — R12 HEARTBURN: ICD-10-CM

## 2025-03-13 DIAGNOSIS — R13.10 DYSPHAGIA, UNSPECIFIED TYPE: ICD-10-CM

## 2025-03-13 PROCEDURE — 99244 OFF/OP CNSLTJ NEW/EST MOD 40: CPT | Performed by: NURSE PRACTITIONER

## 2025-03-13 RX ORDER — SODIUM CHLORIDE, SODIUM LACTATE, POTASSIUM CHLORIDE, CALCIUM CHLORIDE 600; 310; 30; 20 MG/100ML; MG/100ML; MG/100ML; MG/100ML
125 INJECTION, SOLUTION INTRAVENOUS CONTINUOUS
OUTPATIENT
Start: 2025-03-13

## 2025-03-13 NOTE — PROGRESS NOTES
Name: Samia Bull      : 1990      MRN: [id number]  Encounter Provider: VIOLETA Hennessy  Encounter Date: 3/13/2025   Encounter department: [facility]  :  Assessment & Plan  Dysphagia, unspecified type   She did start with intermittent bouts of upper esophageal and lower esophageal dysphagia with solids.  Could not pass liquids.  Could not belch.  Symptoms improved with omeprazole 20 mg daily.  She took it for about a week and symptoms have significantly improved.  Reports she did have hematemesis several years ago after taking a large amount of NSAIDs.  Never had an EGD.  She is agreeable to an EGD.  Orders:    Ambulatory Referral to Gastroenterology    EGD; Future    Celiac Panel/Adult; Future    Heartburn  Longstanding history of heartburn for several years on and off.  Recently worsened.  She does eat fermented foods in the month of February and has been doing this for years.  Reports her reflux worsened recently.  She did take omeprazole 20 mg daily for at least a week which significantly improved her symptoms.  Never had an EGD.  Dysphagia as noted above.  No abdominal pain or weight loss.    Orders:    Ambulatory Referral to Gastroenterology    EGD; Future    Celiac Panel/Adult; Future    I reviewed with patient/family potential risks of endoscopic evaluation including possible infection, bleeding or perforation.  Patient/family verbalized understanding of potential risks and agreed to procedure(s).    History of Present Illness   Samia Bull is a 35 y.o. female who presents with heartburn and dysphagia.  She has past medical history of asthma, depression and PTSD, headaches.  HPI    Longstanding history of GERD on and off.  Does also have a history of restrictive anorexia with occasional bouts of vomiting but not bulimia.  Recently heartburn/reflux worsened.  She did start with intermittent bouts of upper esophageal and lower esophageal  dysphagia with solids.  Could not pass liquids.  Could not belch.  Symptoms improved with omeprazole 20 mg daily.  She took it for about a week and symptoms have significantly improved.  Reports she did have hematemesis several years ago after taking a large amount of NSAIDs.  Never had an EGD.    Does report she uses marijuana on a daily basis to help stimulate her appetite and to help her sleep.  BMs are brown and formed.  Denies any melena or hematochezia.  Not sure if she was ever tested for celiac.    CT abdomen pelvis 6/24-appendicitis without evidence of perforation.    Lab 6/24-CBC normal, CMP normal.    Prior EGD/colonoscopy  Never had an EGD or colonoscopy      Review of Systems   Constitutional:  Negative for chills and fever.   HENT:  Positive for trouble swallowing. Negative for ear pain and sore throat.    Eyes:  Negative for pain and visual disturbance.   Respiratory:  Negative for cough and shortness of breath.    Cardiovascular:  Negative for chest pain and palpitations.   Gastrointestinal:  Negative for abdominal pain and vomiting.   Genitourinary:  Negative for dysuria and hematuria.   Musculoskeletal:  Negative for arthralgias and back pain.   Skin:  Negative for color change and rash.   Neurological:  Negative for seizures and syncope.   All other systems reviewed and are negative.   A complete review of systems is negative other than that noted above in the HPI.         Objective   There were no vitals taken for this visit.    Physical Exam  Vitals and nursing note reviewed.   Constitutional:       General: She is not in acute distress.     Appearance: She is well-developed.   HENT:      Head: Normocephalic and atraumatic.   Eyes:      Conjunctiva/sclera: Conjunctivae normal.   Cardiovascular:      Rate and Rhythm: Normal rate and regular rhythm.      Heart sounds: No murmur heard.  Pulmonary:      Effort: Pulmonary effort is normal. No respiratory distress.      Breath sounds: Normal breath  sounds.   Abdominal:      Palpations: Abdomen is soft.      Tenderness: There is no abdominal tenderness.   Musculoskeletal:         General: No swelling.      Cervical back: Neck supple.   Skin:     General: Skin is warm and dry.      Capillary Refill: Capillary refill takes less than 2 seconds.   Neurological:      Mental Status: She is alert.   Psychiatric:         Mood and Affect: Mood normal.            Lab Results: I personally reviewed relevant lab results.

## 2025-03-13 NOTE — H&P (VIEW-ONLY)
Name: Samia Bull      : 1990      MRN: [id number]  Encounter Provider: VIOLETA Hennessy  Encounter Date: 3/13/2025   Encounter department: [facility]  :  Assessment & Plan  Dysphagia, unspecified type   She did start with intermittent bouts of upper esophageal and lower esophageal dysphagia with solids.  Could not pass liquids.  Could not belch.  Symptoms improved with omeprazole 20 mg daily.  She took it for about a week and symptoms have significantly improved.  Reports she did have hematemesis several years ago after taking a large amount of NSAIDs.  Never had an EGD.  She is agreeable to an EGD.  Orders:    Ambulatory Referral to Gastroenterology    EGD; Future    Celiac Panel/Adult; Future    Heartburn  Longstanding history of heartburn for several years on and off.  Recently worsened.  She does eat fermented foods in the month of February and has been doing this for years.  Reports her reflux worsened recently.  She did take omeprazole 20 mg daily for at least a week which significantly improved her symptoms.  Never had an EGD.  Dysphagia as noted above.  No abdominal pain or weight loss.    Orders:    Ambulatory Referral to Gastroenterology    EGD; Future    Celiac Panel/Adult; Future    I reviewed with patient/family potential risks of endoscopic evaluation including possible infection, bleeding or perforation.  Patient/family verbalized understanding of potential risks and agreed to procedure(s).    History of Present Illness   Samia Bull is a 35 y.o. female who presents with heartburn and dysphagia.  She has past medical history of asthma, depression and PTSD, headaches.  HPI    Longstanding history of GERD on and off.  Does also have a history of restrictive anorexia with occasional bouts of vomiting but not bulimia.  Recently heartburn/reflux worsened.  She did start with intermittent bouts of upper esophageal and lower esophageal  dysphagia with solids.  Could not pass liquids.  Could not belch.  Symptoms improved with omeprazole 20 mg daily.  She took it for about a week and symptoms have significantly improved.  Reports she did have hematemesis several years ago after taking a large amount of NSAIDs.  Never had an EGD.    Does report she uses marijuana on a daily basis to help stimulate her appetite and to help her sleep.  BMs are brown and formed.  Denies any melena or hematochezia.  Not sure if she was ever tested for celiac.    CT abdomen pelvis 6/24-appendicitis without evidence of perforation.    Lab 6/24-CBC normal, CMP normal.    Prior EGD/colonoscopy  Never had an EGD or colonoscopy      Review of Systems   Constitutional:  Negative for chills and fever.   HENT:  Positive for trouble swallowing. Negative for ear pain and sore throat.    Eyes:  Negative for pain and visual disturbance.   Respiratory:  Negative for cough and shortness of breath.    Cardiovascular:  Negative for chest pain and palpitations.   Gastrointestinal:  Negative for abdominal pain and vomiting.   Genitourinary:  Negative for dysuria and hematuria.   Musculoskeletal:  Negative for arthralgias and back pain.   Skin:  Negative for color change and rash.   Neurological:  Negative for seizures and syncope.   All other systems reviewed and are negative.   A complete review of systems is negative other than that noted above in the HPI.         Objective   There were no vitals taken for this visit.    Physical Exam  Vitals and nursing note reviewed.   Constitutional:       General: She is not in acute distress.     Appearance: She is well-developed.   HENT:      Head: Normocephalic and atraumatic.   Eyes:      Conjunctiva/sclera: Conjunctivae normal.   Cardiovascular:      Rate and Rhythm: Normal rate and regular rhythm.      Heart sounds: No murmur heard.  Pulmonary:      Effort: Pulmonary effort is normal. No respiratory distress.      Breath sounds: Normal breath  sounds.   Abdominal:      Palpations: Abdomen is soft.      Tenderness: There is no abdominal tenderness.   Musculoskeletal:         General: No swelling.      Cervical back: Neck supple.   Skin:     General: Skin is warm and dry.      Capillary Refill: Capillary refill takes less than 2 seconds.   Neurological:      Mental Status: She is alert.   Psychiatric:         Mood and Affect: Mood normal.            Lab Results: I personally reviewed relevant lab results.

## 2025-03-14 ENCOUNTER — ANESTHESIA EVENT (OUTPATIENT)
Dept: ANESTHESIOLOGY | Facility: HOSPITAL | Age: 35
End: 2025-03-14

## 2025-03-14 ENCOUNTER — ANESTHESIA (OUTPATIENT)
Dept: ANESTHESIOLOGY | Facility: HOSPITAL | Age: 35
End: 2025-03-14

## 2025-03-18 ENCOUNTER — TELEPHONE (OUTPATIENT)
Age: 35
End: 2025-03-18

## 2025-03-18 NOTE — TELEPHONE ENCOUNTER
LVM and requested call back to confirm upcoming EGD scheduled for 3/28 with  at [facility].    Sent pt MYC message to confirm

## 2025-03-28 ENCOUNTER — ANESTHESIA (OUTPATIENT)
Dept: GASTROENTEROLOGY | Facility: MEDICAL CENTER | Age: 35
End: 2025-03-28
Payer: COMMERCIAL

## 2025-03-28 ENCOUNTER — ANESTHESIA EVENT (OUTPATIENT)
Dept: GASTROENTEROLOGY | Facility: MEDICAL CENTER | Age: 35
End: 2025-03-28
Payer: COMMERCIAL

## 2025-03-28 ENCOUNTER — HOSPITAL ENCOUNTER (OUTPATIENT)
Dept: GASTROENTEROLOGY | Facility: MEDICAL CENTER | Age: 35
Setting detail: OUTPATIENT SURGERY
End: 2025-03-28
Payer: COMMERCIAL

## 2025-03-28 VITALS
SYSTOLIC BLOOD PRESSURE: 120 MMHG | HEART RATE: 56 BPM | DIASTOLIC BLOOD PRESSURE: 79 MMHG | TEMPERATURE: 97.2 F | OXYGEN SATURATION: 100 % | RESPIRATION RATE: 20 BRPM

## 2025-03-28 DIAGNOSIS — R13.10 DYSPHAGIA, UNSPECIFIED TYPE: ICD-10-CM

## 2025-03-28 DIAGNOSIS — R12 HEARTBURN: ICD-10-CM

## 2025-03-28 LAB
EXT PREGNANCY TEST URINE: NEGATIVE
EXT. CONTROL: NORMAL

## 2025-03-28 PROCEDURE — 88305 TISSUE EXAM BY PATHOLOGIST: CPT | Performed by: PATHOLOGY

## 2025-03-28 PROCEDURE — 43239 EGD BIOPSY SINGLE/MULTIPLE: CPT | Performed by: INTERNAL MEDICINE

## 2025-03-28 PROCEDURE — 81025 URINE PREGNANCY TEST: CPT | Performed by: ANESTHESIOLOGY

## 2025-03-28 RX ORDER — SODIUM CHLORIDE, SODIUM LACTATE, POTASSIUM CHLORIDE, CALCIUM CHLORIDE 600; 310; 30; 20 MG/100ML; MG/100ML; MG/100ML; MG/100ML
125 INJECTION, SOLUTION INTRAVENOUS CONTINUOUS
Status: SHIPPED | OUTPATIENT
Start: 2025-03-28

## 2025-03-28 RX ORDER — SODIUM CHLORIDE, SODIUM LACTATE, POTASSIUM CHLORIDE, CALCIUM CHLORIDE 600; 310; 30; 20 MG/100ML; MG/100ML; MG/100ML; MG/100ML
125 INJECTION, SOLUTION INTRAVENOUS CONTINUOUS
Status: CANCELLED | OUTPATIENT
Start: 2025-03-28

## 2025-03-28 RX ORDER — PROPOFOL 10 MG/ML
INJECTION, EMULSION INTRAVENOUS AS NEEDED
Status: DISCONTINUED | OUTPATIENT
Start: 2025-03-28 | End: 2025-03-28

## 2025-03-28 RX ORDER — LIDOCAINE HYDROCHLORIDE 20 MG/ML
INJECTION, SOLUTION EPIDURAL; INFILTRATION; INTRACAUDAL; PERINEURAL AS NEEDED
Status: DISCONTINUED | OUTPATIENT
Start: 2025-03-28 | End: 2025-03-28

## 2025-03-28 RX ORDER — OMEPRAZOLE 20 MG/1
20 CAPSULE, DELAYED RELEASE ORAL
Qty: 60 CAPSULE | Refills: 0 | Status: SHIPPED | OUTPATIENT
Start: 2025-03-28 | End: 2025-05-27

## 2025-03-28 RX ORDER — ONDANSETRON 2 MG/ML
4 INJECTION INTRAMUSCULAR; INTRAVENOUS ONCE AS NEEDED
Status: SHIPPED | OUTPATIENT
Start: 2025-03-28

## 2025-03-28 RX ADMIN — PROPOFOL 50 MG: 10 INJECTION, EMULSION INTRAVENOUS at 11:03

## 2025-03-28 RX ADMIN — PROPOFOL 50 MG: 10 INJECTION, EMULSION INTRAVENOUS at 11:02

## 2025-03-28 RX ADMIN — LIDOCAINE HYDROCHLORIDE 80 MG: 20 INJECTION, SOLUTION EPIDURAL; INFILTRATION; INTRACAUDAL at 10:58

## 2025-03-28 RX ADMIN — PROPOFOL 50 MG: 10 INJECTION, EMULSION INTRAVENOUS at 11:00

## 2025-03-28 RX ADMIN — PROPOFOL 50 MG: 10 INJECTION, EMULSION INTRAVENOUS at 11:06

## 2025-03-28 RX ADMIN — SODIUM CHLORIDE, SODIUM LACTATE, POTASSIUM CHLORIDE, AND CALCIUM CHLORIDE 125 ML/HR: .6; .31; .03; .02 INJECTION, SOLUTION INTRAVENOUS at 10:51

## 2025-03-28 RX ADMIN — PROPOFOL 150 MG: 10 INJECTION, EMULSION INTRAVENOUS at 10:58

## 2025-03-28 NOTE — ANESTHESIA POSTPROCEDURE EVALUATION
Post-Op Assessment Note    CV Status:  Stable    Pain management: adequate       Mental Status:  Sleepy   Hydration Status:  Euvolemic   PONV Controlled:  Controlled   Airway Patency:  Patent     Post Op Vitals Reviewed: Yes    No anethesia notable event occurred.    Staff: CRNA           Last Filed PACU Vitals:  Vitals Value Taken Time   Temp 97.9    Pulse 63 03/28/25 1113   /60 03/28/25 1113   Resp 16 03/28/25 1113   SpO2 98 % 03/28/25 1113       Modified Felix:     Vitals Value Taken Time   Activity 0 03/28/25 1113   Respiration 2 03/28/25 1113   Circulation 2 03/28/25 1113   Consciousness 0 03/28/25 1113   Oxygen Saturation 2 03/28/25 1113     Modified Felix Score: 6

## 2025-03-28 NOTE — ANESTHESIA PREPROCEDURE EVALUATION
Procedure:  EGD    Relevant Problems   NEURO/PSYCH   (+) Anxiety   (+) Chronic daily headache   (+) Mood disorder of depressed type   (+) PTSD (post-traumatic stress disorder)   (+) Recurrent major depressive disorder, in partial remission (HCC)      PULMONARY   (+) Asthma        Physical Exam    Airway    Mallampati score: II  TM Distance: >3 FB  Neck ROM: full     Dental   No notable dental hx     Cardiovascular  Rhythm: regular, No weak pulses    Pulmonary   No stridor    Other Findings  post-pubertal.      Anesthesia Plan  ASA Score- 2     Anesthesia Type- IV sedation with anesthesia with ASA Monitors.         Additional Monitors:     Airway Plan:            Plan Factors-    Chart reviewed.   Existing labs reviewed. Patient summary reviewed.                  Induction- intravenous.    Postoperative Plan-         Informed Consent- Anesthetic plan and risks discussed with patient.  I personally reviewed this patient with the CRNA. Discussed and agreed on the Anesthesia Plan with the CRNA..      NPO Status:  No vitals data found for the desired time range.

## 2025-04-02 ENCOUNTER — RESULTS FOLLOW-UP (OUTPATIENT)
Age: 35
End: 2025-04-02

## 2025-04-02 PROCEDURE — 88305 TISSUE EXAM BY PATHOLOGIST: CPT | Performed by: PATHOLOGY

## 2025-04-04 ENCOUNTER — OFFICE VISIT (OUTPATIENT)
Dept: GYNECOLOGY | Facility: CLINIC | Age: 35
End: 2025-04-04
Payer: COMMERCIAL

## 2025-04-04 VITALS — DIASTOLIC BLOOD PRESSURE: 64 MMHG | SYSTOLIC BLOOD PRESSURE: 118 MMHG

## 2025-04-04 DIAGNOSIS — Z30.42 ENCOUNTER FOR SURVEILLANCE OF INJECTABLE CONTRACEPTIVE: Primary | ICD-10-CM

## 2025-04-04 PROCEDURE — 96372 THER/PROPH/DIAG INJ SC/IM: CPT

## 2025-04-04 RX ORDER — MEDROXYPROGESTERONE ACETATE 150 MG/ML
150 INJECTION, SUSPENSION INTRAMUSCULAR
Status: SHIPPED | OUTPATIENT
Start: 2025-04-04 | End: 2026-03-30

## 2025-04-04 RX ADMIN — MEDROXYPROGESTERONE ACETATE 150 MG: 150 INJECTION, SUSPENSION INTRAMUSCULAR at 15:23

## 2025-04-04 NOTE — PROGRESS NOTES
pt presents for depot injection. tolerated well in left deltoid. pt supplied medication, bill administration only.   NDC 5605-7831-95  LOT [id number]  Exp 8/31/2026

## 2025-04-23 DIAGNOSIS — G47.9 SLEEP DIFFICULTIES: ICD-10-CM

## 2025-04-23 DIAGNOSIS — F41.9 ANXIETY: ICD-10-CM

## 2025-04-23 RX ORDER — HYDROXYZINE HYDROCHLORIDE 10 MG/1
10 TABLET, FILM COATED ORAL EVERY 6 HOURS PRN
Qty: 30 TABLET | Refills: 0 | Status: SHIPPED | OUTPATIENT
Start: 2025-04-23

## 2025-05-07 NOTE — PROGRESS NOTES
"Name: Samia Bull      : 1990      MRN: [id number]  Encounter Provider: VIOLETA Chanel  Encounter Date: 2025   Encounter department: [facility]  :  Assessment & Plan  HPV in female  Repap done. Will notify pt of results.            History of Present Illness   Pt presents for repap.   24 - normal pap, +HPV, -16, -18  24 - colpo only, no bx    Sexually active, monogamous, 2 yrs.          Review of Systems   Constitutional: Negative.    HENT: Negative.     Respiratory: Negative.     Cardiovascular: Negative.    Gastrointestinal: Negative.    Endocrine: Negative.    Genitourinary:  Negative for difficulty urinating, dyspareunia, dysuria, frequency, menstrual problem, pelvic pain, urgency, vaginal bleeding, vaginal discharge and vaginal pain.   Musculoskeletal: Negative.    Skin: Negative.    Neurological: Negative.    Psychiatric/Behavioral: Negative.            Objective   /76 (BP Location: Left arm, Patient Position: Sitting, Cuff Size: Standard)   Ht 5' 2\" (1.575 m)   Wt 58.1 kg (128 lb)   BMI 23.41 kg/m²      Physical Exam  Vitals and nursing note reviewed. Exam conducted with a chaperone present.   Constitutional:       Appearance: Normal appearance.   HENT:      Head: Normocephalic and atraumatic.   Pulmonary:      Effort: Pulmonary effort is normal.   Genitourinary:     General: Normal vulva.      Exam position: Supine.      Pubic Area: No rash.       Labia:         Right: No rash, tenderness, lesion or injury.         Left: No rash, tenderness, lesion or injury.       Urethra: No urethral pain, urethral swelling or urethral lesion.      Vagina: No signs of injury and foreign body. No vaginal discharge, erythema, tenderness, bleeding or lesions.      Cervix: No discharge, friability, lesion, erythema, cervical bleeding or eversion.   Musculoskeletal:         General: Normal range of motion.      Cervical back: Normal range of " motion.   Skin:     General: Skin is warm and dry.   Neurological:      Mental Status: She is alert and oriented to person, place, and time.   Psychiatric:         Mood and Affect: Mood normal.         Behavior: Behavior normal.         Thought Content: Thought content normal.         Judgment: Judgment normal.

## 2025-05-09 ENCOUNTER — OFFICE VISIT (OUTPATIENT)
Dept: GYNECOLOGY | Facility: CLINIC | Age: 35
End: 2025-05-09

## 2025-05-09 VITALS
WEIGHT: 128 LBS | BODY MASS INDEX: 23.55 KG/M2 | HEIGHT: 62 IN | DIASTOLIC BLOOD PRESSURE: 76 MMHG | SYSTOLIC BLOOD PRESSURE: 120 MMHG

## 2025-05-09 DIAGNOSIS — B97.7 HPV IN FEMALE: Primary | ICD-10-CM

## 2025-05-09 PROCEDURE — 87624 HPV HI-RISK TYP POOLED RSLT: CPT | Performed by: OBSTETRICS & GYNECOLOGY

## 2025-05-09 PROCEDURE — 88175 CYTOPATH C/V AUTO FLUID REDO: CPT | Performed by: OBSTETRICS & GYNECOLOGY

## 2025-05-14 LAB
LAB AP GYN PRIMARY INTERPRETATION: NORMAL
Lab: NORMAL

## 2025-05-16 ENCOUNTER — RESULTS FOLLOW-UP (OUTPATIENT)
Dept: GYNECOLOGY | Facility: CLINIC | Age: 35
End: 2025-05-16

## 2025-05-22 ENCOUNTER — RESULTS FOLLOW-UP (OUTPATIENT)
Dept: CARDIOLOGY CLINIC | Facility: CLINIC | Age: 35
End: 2025-05-22

## 2025-05-30 ENCOUNTER — OFFICE VISIT (OUTPATIENT)
Dept: CARDIOLOGY CLINIC | Facility: CLINIC | Age: 35
End: 2025-05-30
Payer: COMMERCIAL

## 2025-05-30 VITALS
DIASTOLIC BLOOD PRESSURE: 88 MMHG | BODY MASS INDEX: 24.33 KG/M2 | HEIGHT: 62 IN | SYSTOLIC BLOOD PRESSURE: 128 MMHG | HEART RATE: 70 BPM | WEIGHT: 132.2 LBS

## 2025-05-30 DIAGNOSIS — R00.2 PALPITATIONS: Primary | ICD-10-CM

## 2025-05-30 DIAGNOSIS — R55 SYNCOPE AND COLLAPSE: ICD-10-CM

## 2025-05-30 PROCEDURE — 99213 OFFICE O/P EST LOW 20 MIN: CPT

## 2025-05-30 PROCEDURE — 93000 ELECTROCARDIOGRAM COMPLETE: CPT

## 2025-05-30 NOTE — PROGRESS NOTES
Cardiology   MD Harley Polk MD, FACC  Henry Parisi DO, [facility] FACP, FASNC Ather Mansoor, MD Rujul Patel, DO, [facility]  Junaid Puentes DO, [facility] FASNC  -------------------------------------------------------------------  [facility]  [address] [place] [address]  Phone: [phone]  Fax: [phone]  05/30/25  Samia Bull  YOB: 1990   MRN: [id number]      Referring Physician: No referring provider defined for this encounter.     HPI: Samia Bull is a 35 y.o. female with:   Syncope - unexplained - loop in place - [place] sci device - negative for arrhythmia x 2 years  Recovering etoh abuse  Former tobacco use  Uses medical marijuana  Depression  Anxiety  Eating d/o    She presents for follow-up, doing well, has no acute complaints today    Review of Systems   Constitutional:  Negative for chills and fever.   HENT:  Negative for facial swelling and sore throat.    Eyes:  Negative for visual disturbance.   Respiratory:  Negative for cough, chest tightness, shortness of breath and wheezing.    Cardiovascular:  Negative for chest pain, palpitations and leg swelling.   Gastrointestinal:  Negative for abdominal pain, blood in stool, constipation, diarrhea, nausea and vomiting.   Endocrine: Negative for cold intolerance and heat intolerance.   Genitourinary:  Negative for decreased urine volume, difficulty urinating, dysuria and hematuria.   Musculoskeletal:  Negative for arthralgias, back pain and myalgias.   Skin:  Negative for rash.   Neurological:  Negative for dizziness, syncope, weakness and numbness.   Psychiatric/Behavioral:  Negative for agitation, behavioral problems and confusion. The patient is not nervous/anxious.         OBJECTIVE  Vitals:    05/30/25 1531   BP: 128/88   Pulse: 70       Physical Exam  Constitutional: awake, alert and oriented, in no acute distress, no obvious deformities  Head: Normocephalic, without  obvious abnormality, atraumatic  Eyes: conjunctivae clear and moist. Sclera anicteric.  No xanthelasmas. Pupils equal bilaterally.  Extraocular motions are full.  Ear nose mouth and throat: ears are symmetrical bilaterally, hearing appears to be equal bilaterally, no nasal discharge or epistaxis, oropharynx is clear with moist mucous membranes  Neck:  Trachea is midline, neck is supple, no thyromegaly or significant lymphadenopathy, there is full range of motion.  Lungs: clear to auscultation bilaterally, no wheezes, no rales, no rhonchi, no accessory muscle use, breathing is nonlabored  Heart: Regular rhythm with a Normal heart rate, S1, S2 normal, No Murmur, no click, rub or gallop, No lower extremity edema  Abdomen: soft, non-tender; bowel sounds normal; no masses,  no organomegaly  Psychiatric:  Patient is oriented to time, place, person, mood/affect is negative for depression, anxiety, agitation, appears to have appropriate insight  Skin: Skin is warm, dry, intact. No obvious rashes or lesions on exposed extremities.  Nail beds are pink with no cyanosis or clubbing.    EKG:  Results for orders placed or performed in visit on 05/30/25   POCT ECG    Impression    Normal Sinus Rhythm, Normal EKG.          IMPRESSION:  Syncope - unexplained - loop in place - Lazarus Effect device - negative for arrhythmia x 2 years  Recovering etoh abuse  Former tobacco use  Uses medical marijuana  Depression  Anxiety  Eating d/o    DISCUSSION/RECOMMENDATIONS:  She continues to do well from a heart standpoint, no recurrent syncope.  Loop recorder did show some episodes of bradycardia for which she was asymptomatic from  Will continue with loop recorder monitoring.  Once battery reaches end-of-life she would like the device explanted    Junaid Puentes DO, ETIENNE ALBARADO FASNC  --------------------------------------------------------------------------------  TREADMILL STRESS  No results found for this or any previous visit.      ----------------------------------------------------------------------------------------------  NUCLEAR STRESS TEST: No results found for this or any previous visit.    No results found for this or any previous visit.      --------------------------------------------------------------------------------  CATH:  No results found for this or any previous visit.    --------------------------------------------------------------------------------  ECHO:   No results found for this or any previous visit.    No results found for this or any previous visit.    --------------------------------------------------------------------------------  HOLTER  No results found for this or any previous visit.    No results found for this or any previous visit.    --------------------------------------------------------------------------------  CAROTIDS  No results found for this or any previous visit.     --------------------------------------------------------------------------------  Diagnoses and all orders for this visit:    Palpitations  -     POCT ECG    Syncope and collapse  -     POCT ECG       ======================================================    Past Medical History[1]  Past Surgical History[2]      Medications  Current Medications[3]     Allergies[4]    Social History     Socioeconomic History    Marital status: Single     Spouse name: Not on file    Number of children: Not on file    Years of education: Not on file    Highest education level: Not on file   Occupational History    Not on file   Tobacco Use    Smoking status: Former     Current packs/day: 0.00     Average packs/day: 0.3 packs/day for 15.0 years (3.8 ttl pk-yrs)     Types: Cigarettes     Quit date:      Years since quittin.4    Smokeless tobacco: Never   Vaping Use    Vaping status: Every Day    Substances: THC, CBD   Substance and Sexual Activity    Alcohol use: No    Drug use: Yes     Types: Marijuana     Comment: Medical marijuana    Sexual  "activity: Yes     Partners: Male     Birth control/protection: Injection     Comment: In [employer] uses birth control   Other Topics Concern    Not on file   Social History Narrative    Not on file     Social Drivers of Health     Financial Resource Strain: Not on file   Food Insecurity: No Food Insecurity (6/5/2024)    Nursing - Inadequate Food Risk Classification     Worried About Running Out of Food in the Last Year: Never true     Ran Out of Food in the Last Year: Never true     Ran Out of Food in the Last Year: Not on file   Transportation Needs: No Transportation Needs (6/5/2024)    PRAPARE - Transportation     Lack of Transportation (Medical): No     Lack of Transportation (Non-Medical): No   Physical Activity: Not on file   Stress: Not on file   Social Connections: Not on file   Intimate Partner Violence: Not on file   Housing Stability: Low Risk  (6/5/2024)    Housing Stability Vital Sign     Unable to Pay for Housing in the Last Year: No     Number of Times Moved in the Last Year: 1     Homeless in the Last Year: No        Family History[5]    Lab Results   Component Value Date    WBC 9.66 06/05/2024    HGB 14.2 06/05/2024    HCT 43.8 06/05/2024    MCV 86 06/05/2024     06/05/2024      Lab Results   Component Value Date    SODIUM 138 06/05/2024    K 3.9 06/05/2024     06/05/2024    CO2 25 06/05/2024    BUN 14 06/05/2024    CREATININE 0.76 06/05/2024    GLUC 100 06/05/2024    CALCIUM 9.3 06/05/2024      Lab Results   Component Value Date    HGBA1C 5.5 05/18/2024      No results found for: \"CHOL\"  Lab Results   Component Value Date    HDL 68 05/18/2024     Lab Results   Component Value Date    LDLCALC 88 05/18/2024     Lab Results   Component Value Date    TRIG 40 05/18/2024     No results found for: \"CHOLHDL\"   No results found for: \"INR\", \"PROTIME\"       Patient Active Problem List    Diagnosis Date Noted    Acute appendicitis with localized peritonitis, without perforation, abscess, or " "gangrene 06/05/2024    Anogenital human papilloma virus (HPV) infection 12/06/2023    Palpitations 10/06/2022    Chronic daily headache 10/04/2022    Seizure-like activity (HCC) 09/16/2022    Syncope and collapse 09/16/2022    Herpes 11/11/2021    PTSD (post-traumatic stress disorder) 11/11/2021    Polyarthralgia 06/02/2021    Anxiety 07/13/2020    Eating disorder 11/01/2018    History of suicidal ideation 11/01/2018    Asthma 06/01/2018    Mood disorder of depressed type 06/01/2018    On Depo-Provera for contraception 10/06/2017    Alcohol dependence, episodic drinking behavior (HCC) 04/12/2017    Recurrent major depressive disorder, in partial remission (HCC) 11/03/2014       Portions of the record may have been created with voice recognition software. Occasional wrong word or \"sound a like\" substitutions may have occurred due to the inherent limitations of voice recognition software. Read the chart carefully and recognize, using context, where substitutions have occurred.    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     Depression     Eating disorder     GERD (gastroesophageal reflux disease)     Headache(784.0)     Herpes simplex infection     Infectious viral hepatitis     Palpitations     Psychiatric disorder     Sciatica     Substance abuse (HCC)     Urinary tract infection    [2]   Past Surgical History:  Procedure Laterality Date    ABDOMINAL SURGERY      APPENDECTOMY      APPENDECTOMY LAPAROSCOPIC N/A 6/5/2024    Procedure: APPENDECTOMY LAPAROSCOPIC;  Surgeon: Ignacia Vaughan MD;  Location: [facility] OR;  Service: General    WISDOM TOOTH EXTRACTION     [3]   Current Outpatient Medications   Medication Sig Dispense Refill    hydrOXYzine HCL (ATARAX) 10 mg tablet Take 1 tablet (10 mg total) by mouth every 6 (six) hours as needed for anxiety 30 tablet 0    " medroxyPROGESTERone (Depo-Provera) 150 mg/mL injection Inject 1 mL (150 mg total) into a muscle every 3 (three) months 1 mL 3    omeprazole (PriLOSEC) 20 mg delayed release capsule Take 1 capsule (20 mg total) by mouth daily before breakfast (Patient taking differently: Take 20 mg by mouth if needed) 60 capsule 0    valACYclovir (VALTREX) 500 mg tablet Take 500 mg by mouth as needed       Current Facility-Administered Medications   Medication Dose Route Frequency Provider Last Rate Last Admin    medroxyPROGESTERone (DEPO-PROVERA) IM injection 150 mg  150 mg Intramuscular Q3 Months    150 mg at 04/04/25 1523   [4]   Allergies  Allergen Reactions    Lactose - Food Allergy Diarrhea    Latex Itching and Rash     Burning and itching     Petrolatum Itching    White Petrolatum Rash   [5]   Family History  Problem Relation Name Age of Onset    Hypertension Mother Julisas Bull     Arthritis Mother Julissa Bull

## 2025-06-04 ENCOUNTER — REMOTE DEVICE CLINIC VISIT (OUTPATIENT)
Dept: CARDIOLOGY CLINIC | Facility: CLINIC | Age: 35
End: 2025-06-04
Payer: COMMERCIAL

## 2025-06-04 ENCOUNTER — RESULTS FOLLOW-UP (OUTPATIENT)
Dept: NON INVASIVE DIAGNOSTICS | Facility: HOSPITAL | Age: 35
End: 2025-06-04

## 2025-06-04 DIAGNOSIS — R55 SYNCOPE AND COLLAPSE: Primary | ICD-10-CM

## 2025-06-04 PROCEDURE — 93298 REM INTERROG DEV EVAL SCRMS: CPT | Performed by: STUDENT IN AN ORGANIZED HEALTH CARE EDUCATION/TRAINING PROGRAM

## 2025-06-04 NOTE — PROGRESS NOTES
"Results for orders placed or performed in visit on 06/04/25   Cardiac EP device report    Narrative    BSCI LUX-dx ICM - ACTIVE SYSTEM IS MRI CONDITIONAL  LATITUDE TRANSMISSION: BATTERY STATUS \"OK.\" SINCE 5/22/25 REPORT, DEVICE DETECTED 2 NEW CELSA EPISODES @ 38 & 40 BPM MAX DURATION 9 SEC. NO PT ACTIVATED EPISODES. PRESENTING RHYTHM NSR. NORMAL DEVICE FUNCTION. GV        "

## 2025-06-17 ENCOUNTER — TELEMEDICINE (OUTPATIENT)
Dept: OTHER | Facility: HOSPITAL | Age: 35
End: 2025-06-17

## 2025-06-17 ENCOUNTER — HOSPITAL ENCOUNTER (EMERGENCY)
Facility: HOSPITAL | Age: 35
Discharge: HOME | End: 2025-06-17
Attending: EMERGENCY MEDICINE | Admitting: EMERGENCY MEDICINE
Payer: COMMERCIAL

## 2025-06-17 ENCOUNTER — NURSE TRIAGE (OUTPATIENT)
Dept: OTHER | Facility: OTHER | Age: 35
End: 2025-06-17

## 2025-06-17 VITALS
DIASTOLIC BLOOD PRESSURE: 78 MMHG | HEART RATE: 61 BPM | OXYGEN SATURATION: 97 % | WEIGHT: 130.73 LBS | RESPIRATION RATE: 14 BRPM | BODY MASS INDEX: 23.91 KG/M2 | TEMPERATURE: 97.8 F | SYSTOLIC BLOOD PRESSURE: 126 MMHG

## 2025-06-17 VITALS — TEMPERATURE: 97.9 F

## 2025-06-17 DIAGNOSIS — R51.9 HEADACHE: Primary | ICD-10-CM

## 2025-06-17 DIAGNOSIS — R51.9 INTRACTABLE HEADACHE, UNSPECIFIED CHRONICITY PATTERN, UNSPECIFIED HEADACHE TYPE: Primary | ICD-10-CM

## 2025-06-17 LAB — S PYO DNA THROAT QL NAA+PROBE: NOT DETECTED

## 2025-06-17 PROCEDURE — NC001 PR NO CHARGE: Performed by: PHYSICIAN ASSISTANT

## 2025-06-17 PROCEDURE — 87651 STREP A DNA AMP PROBE: CPT | Performed by: EMERGENCY MEDICINE

## 2025-06-17 PROCEDURE — 99284 EMERGENCY DEPT VISIT MOD MDM: CPT | Performed by: EMERGENCY MEDICINE

## 2025-06-17 PROCEDURE — 99283 EMERGENCY DEPT VISIT LOW MDM: CPT

## 2025-06-17 PROCEDURE — 96375 TX/PRO/DX INJ NEW DRUG ADDON: CPT

## 2025-06-17 PROCEDURE — 96361 HYDRATE IV INFUSION ADD-ON: CPT

## 2025-06-17 PROCEDURE — 96374 THER/PROPH/DIAG INJ IV PUSH: CPT

## 2025-06-17 RX ORDER — KETOROLAC TROMETHAMINE 30 MG/ML
30 INJECTION, SOLUTION INTRAMUSCULAR; INTRAVENOUS ONCE
Status: COMPLETED | OUTPATIENT
Start: 2025-06-17 | End: 2025-06-17

## 2025-06-17 RX ORDER — ONDANSETRON 2 MG/ML
4 INJECTION INTRAMUSCULAR; INTRAVENOUS ONCE
Status: COMPLETED | OUTPATIENT
Start: 2025-06-17 | End: 2025-06-17

## 2025-06-17 RX ORDER — ACETAMINOPHEN 10 MG/ML
1000 INJECTION, SOLUTION INTRAVENOUS ONCE
Status: COMPLETED | OUTPATIENT
Start: 2025-06-17 | End: 2025-06-17

## 2025-06-17 RX ORDER — ONDANSETRON 4 MG/1
4 TABLET, ORALLY DISINTEGRATING ORAL EVERY 8 HOURS PRN
Qty: 12 TABLET | Refills: 0 | Status: SHIPPED | OUTPATIENT
Start: 2025-06-17

## 2025-06-17 RX ADMIN — ACETAMINOPHEN 1000 MG: 10 INJECTION INTRAVENOUS at 13:51

## 2025-06-17 RX ADMIN — SODIUM CHLORIDE 1000 ML: 0.9 INJECTION, SOLUTION INTRAVENOUS at 13:47

## 2025-06-17 RX ADMIN — KETOROLAC TROMETHAMINE 30 MG: 30 INJECTION, SOLUTION INTRAMUSCULAR; INTRAVENOUS at 13:47

## 2025-06-17 RX ADMIN — ONDANSETRON 4 MG: 2 INJECTION INTRAMUSCULAR; INTRAVENOUS at 13:49

## 2025-06-17 NOTE — ED PROVIDER NOTES
Time reflects when diagnosis was documented in both MDM as applicable and the Disposition within this note       Time User Action Codes Description Comment    6/17/2025  1:32 PM Saloni Shook Add [R51.9] Headache           ED Disposition       ED Disposition   Discharge    Condition   Stable    Date/Time   Tue Jun 17, 2025  3:19 PM    Comment   Samia Bull discharge to home/self care.                   Assessment & Plan       Medical Decision Making  Persistent HA since hitting self in the head repeatedly on Saturday - no treatment PTA.  Pt w/ very low mechanism for injury, no risk factors for intracranial bleeding and normal neuro exam. No imaging warranted at this time.  Will tx ha w/ iv ketorolac, acetaminophen, ondansetron and fluids.     Problems Addressed:  Headache: acute illness or injury    Amount and/or Complexity of Data Reviewed  External Data Reviewed: notes.     Details: UC note reviewed  Labs: ordered.    Risk  Prescription drug management.        ED Course as of 06/17/25 1621   Tue Jun 17, 2025   1517 HA essentially resolved and feels much better       Medications   sodium chloride 0.9 % bolus 1,000 mL (0 mL Intravenous Stopped 6/17/25 1540)   ondansetron (ZOFRAN) injection 4 mg (4 mg Intravenous Given 6/17/25 1349)   ketorolac (TORADOL) injection 30 mg (30 mg Intravenous Given 6/17/25 1347)   acetaminophen (Ofirmev) injection 1,000 mg (0 mg Intravenous Stopped 6/17/25 1406)       ED Risk Strat Scores                    No data recorded                            History of Present Illness       Chief Complaint   Patient presents with    Head Injury    Headache     Pt reports having a headache after she had a panic attack and hit herself in the head.        Past Medical History[1]   Past Surgical History[2]   Family History[3]   Social History[4]   E-Cigarette/Vaping    E-Cigarette Use Current Every Day User       E-Cigarette/Vaping Substances    Nicotine No     THC Yes     CBD Yes      Flavoring No     Other No     Unknown No       I have reviewed and agree with the history as documented.     Patient presents with:  Head Injury  Headache: Pt reports having a headache after she had a panic attack and hit herself in the head.     35y F sent in after Telemedicine visit for evaluation of headache.  Pt reports hx of panic attacks and on Saturday had a reported severe panic attack that lasted about 10 minutes during which time she hit herself in the head with her open hand and with her fist to try and stop the panic attack.  No LOC.  Since then, gradually worsening headache which is no reported as severe.  Pt w/ hx of recurrent headaches w/ last headache this severe about a year ago when she fainted.  Pt hasn't taken anything for her severe headache - not even otc acetaminophen stating that acetaminophen and nsaids give her GERD.  States she usually txs her HA w/ fluids and electrolytes.     Denies any double vision, no unilateral numbness or weakness, no trouble w/ coordination. Not on an antiplatelet agents or OACs.         History provided by:  Patient and medical records   used: No    Headache      Review of Systems   Neurological:  Positive for headaches.   All other systems reviewed and are negative.          Objective       ED Triage Vitals   Temperature Pulse Blood Pressure Respirations SpO2 Patient Position - Orthostatic VS   06/17/25 1230 06/17/25 1230 06/17/25 1230 06/17/25 1230 06/17/25 1230 06/17/25 1230   97.8 °F (36.6 °C) 61 126/78 14 97 % Sitting      Temp src Heart Rate Source BP Location FiO2 (%) Pain Score    -- -- 06/17/25 1230 -- 06/17/25 1347      Right arm  9      Vitals      Date and Time Temp Pulse SpO2 Resp BP Pain Score FACES Pain Rating User   06/17/25 1347 -- -- -- -- -- 9 -- BA   06/17/25 1230 97.8 °F (36.6 °C) 61 97 % 14 126/78 -- -- JUANA            Physical Exam  Vitals and nursing note reviewed.   Constitutional:       General: She is not in acute  distress.     Appearance: Normal appearance. She is not ill-appearing, toxic-appearing or diaphoretic.   HENT:      Head: Normocephalic and atraumatic.     Eyes:      Extraocular Movements: Extraocular movements intact.      Conjunctiva/sclera: Conjunctivae normal.      Pupils: Pupils are equal, round, and reactive to light.       Cardiovascular:      Rate and Rhythm: Normal rate.   Pulmonary:      Effort: Pulmonary effort is normal.     Musculoskeletal:      Cervical back: Normal range of motion.     Skin:     General: Skin is warm.     Neurological:      General: No focal deficit present.      Mental Status: She is alert and oriented to person, place, and time.      Cranial Nerves: No cranial nerve deficit.      Sensory: No sensory deficit.      Motor: No weakness.      Coordination: Coordination normal.         Results Reviewed       Procedure Component Value Units Date/Time    Strep A PCR [[id number]]  (Normal) Collected: 06/17/25 1340    Lab Status: Final result Specimen: Throat Updated: 06/17/25 1426     STREP A PCR Not Detected            No orders to display       Procedures    ED Medication and Procedure Management   Prior to Admission Medications   Prescriptions Last Dose Informant Patient Reported? Taking?   hydrOXYzine HCL (ATARAX) 10 mg tablet  Self No No   Sig: Take 1 tablet (10 mg total) by mouth every 6 (six) hours as needed for anxiety   omeprazole (PriLOSEC) 20 mg delayed release capsule  Self No No   Sig: Take 1 capsule (20 mg total) by mouth daily before breakfast   valACYclovir (VALTREX) 500 mg tablet  Self Yes No   Sig: Take 500 mg by mouth as needed      Facility-Administered Medications Last Administration Doses Remaining   medroxyPROGESTERone (DEPO-PROVERA) IM injection 150 mg 4/4/2025  3:23 PM 3        Discharge Medication List as of 6/17/2025  3:22 PM        START taking these medications    Details   ondansetron (ZOFRAN-ODT) 4 mg disintegrating tablet Take 1 tablet (4 mg total) by mouth  every 8 (eight) hours as needed for nausea or vomiting, Starting Tue 6/17/2025, Normal           CONTINUE these medications which have NOT CHANGED    Details   hydrOXYzine HCL (ATARAX) 10 mg tablet Take 1 tablet (10 mg total) by mouth every 6 (six) hours as needed for anxiety, Starting Wed 4/23/2025, Normal      omeprazole (PriLOSEC) 20 mg delayed release capsule Take 1 capsule (20 mg total) by mouth daily before breakfast, Starting Fri 3/28/2025, Until Tue 6/17/2025, Normal      valACYclovir (VALTREX) 500 mg tablet Take 500 mg by mouth as needed, Starting Fri 11/17/2023, Historical Med           No discharge procedures on file.  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     Depression     Eating disorder     GERD (gastroesophageal reflux disease)     Headache(784.0)     Herpes simplex infection     Infectious viral hepatitis     Palpitations     Psychiatric disorder     Sciatica     Substance abuse (HCC)     Urinary tract infection    [2]   Past Surgical History:  Procedure Laterality Date    ABDOMINAL SURGERY      APPENDECTOMY      APPENDECTOMY LAPAROSCOPIC N/A 6/5/2024    Procedure: APPENDECTOMY LAPAROSCOPIC;  Surgeon: Ignacia Vaughan MD;  Location: [facility];  Service: General    WISDOM TOOTH EXTRACTION     [3]   Family History  Problem Relation Name Age of Onset    Hypertension Mother Julissa Bull     Arthritis Mother Julissa Bull    [4]   Social History  Tobacco Use    Smoking status: Former     Current packs/day: 0.00     Average packs/day: 0.3 packs/day for 15.0 years (3.8 ttl pk-yrs)     Types: Cigarettes     Quit date: 2005     Years since quitting:  20.4    Smokeless tobacco: Never   Vaping Use    Vaping status: Every Day    Substances: THC, CBD   Substance Use Topics    Alcohol use: No    Drug use: Yes     Types: Marijuana     Comment: Medical marijuana        Saloni Shook DO  06/17/25 6464

## 2025-06-17 NOTE — TELEPHONE ENCOUNTER
"REASON FOR CONVERSATION: Cold Like Symptoms and Anxiety    SYMPTOMS: Cold-like symptoms and anxiety    OTHER HEALTH INFORMATION: Patient calling in for cold-like symptoms and mentioned she had a severe panic attack on Saturday 6/14.      PROTOCOL DISPOSITION: Home Care    CARE ADVICE PROVIDED: Advised patient for cold symptoms- humidifier, tylenol for pain, warm bath/shower, rest, hydration.  For her anxiety, I advised patient to speak to her therapist today if available, she was agreeable, and to call us if she had the feeling of a panic attack coming on and we can be with her for support.  Appointment was made with her PCP office for 6/19 at 8:45am regarding her anxiety. Virtual appointment was made today at 11am for her cold symptoms per patient request.    PRACTICE FOLLOW-UP: No        Reason for Disposition   Care advice for mild cough, questions about   MILD anxiety symptoms (e.g., anxiety symptoms are mild and intermittent; symptoms do not interfere with daily activities)    Answer Assessment - Initial Assessment Questions  1. ONSET: \"When did the post nasal drip start?\"         Post nasal drip that started yesterday    2. AMOUNT: \"How much drip is there?\"         Mild-moderate post nasal drip    3. COUGH: \"Do you have a cough?\" If Yes, ask: \"Describe the color of your mucus.\" (e.g., clear, white, yellow, green)        Very mild, dry cough    4. RESPIRATORY DISTRESS: \"Describe your breathing.\"         Feels like patient is breathing through a straw, feels congested    5. FEVER: \"Do you have a fever?\" If Yes, ask: \"What is your temperature, how was it measured, and when did it start?\"      No fevers, 98.7 temporal    6. SEVERITY: \"Overall, how bad are you feeling right now?\" (e.g., doesn't interfere with normal activities, staying home from school/work, staying in bed)        Patient feels more tired than usual and has been staying in bed    7. OTHER SYMPTOMS: \"Do you have any other symptoms?\" (e.g., earache, " "mouth sores, sore throat, wheezing)        More tired than usual, headache- feels like bricks on her head- 8.5/10 pain, sore throat-mild pain    8. PREGNANCY: \"Is there any chance you are pregnant?\" \"When was your last menstrual period?\"        Patient is on depo shot, last period February 14th.      Cannot take NSAIDs and tylenol males patient's stomach upset.  Has drank tea and ate garlic honey for symptoms.  Patient states she was around son's friend who has bronchitis.    Answer Assessment - Initial Assessment Questions  1. CONCERN: \"Did anything happen that prompted you to call today?\"         PTSD triggered panic attack that occurred on Saturday 6/14    2. ANXIETY SYMPTOMS: \"Can you describe how you (your loved one; patient) have been feeling?\" (e.g., tense, restless, panicky, anxious, keyed up, overwhelmed, sense of impending doom).         Hyperventilating, scared, hitting herself in the head because she wanted the panic attack to stop    3. ONSET: \"How long have you been feeling this way?\" (e.g., hours, days, weeks)        Unsure,  panic attack lasted 10-15 min.  Patient states the panic attacks occur more frequently when she is in school which she currently is.    4. SEVERITY: \"How would you rate the level of anxiety?\" (e.g., 0 - 10; or mild, moderate, severe).        Patient states this was one of the worst panic attack she's ever had.    5. FUNCTIONAL IMPAIRMENT: \"How have these feelings affected your ability to do daily activities?\" \"Have you had more difficulty than usual doing your normal daily activities?\" (e.g., getting better, same, worse; self-care, school, work, interactions)        Comes on more when she's in school.    6. HISTORY: \"Have you felt this way before?\" \"Have you ever been diagnosed with an anxiety problem in the past?\" (e.g., generalized anxiety disorder, panic attacks, PTSD). If Yes, ask: \"How was this problem treated?\" (e.g., medicines, counseling, etc.)        Was diagnosed with " "2018 with anxiety per patient, PCP prescribed hydrOXYzine HCL (patient does not take as needed often because it makes her drowsy), sees 2 therapists, does feel like these treatments are helping.    7. RISK OF HARM - SUICIDAL IDEATION: \"Do you ever have thoughts of hurting or killing yourself?\" If Yes, ask:  \"Do you have these feelings now?\" \"Do you have a plan on how you would do this?\"        Denies, patient did self harm on Saturday during her panic attack by hitting herself in the head but she denies any thoughts of self harm or suicidal thoughts currently.      8. TREATMENT:  \"What has been done so far to treat this anxiety?\" (e.g., medicines, relaxation strategies). \"What has helped?\"        hydrOXYzine HCL, patient states she hits herself in the head when the panic attacks are severe like this recent one.    9. THERAPIST: \"Do you have a counselor or therapist?\" If Yes, ask: \"What is their name?\"        Yes, Samia dhillon- [facility], gumaro monsalve- [facility]    10. POTENTIAL TRIGGERS: \"Do you drink caffeinated beverages (e.g., coffee, merissa, teas), and how much daily?\" \"Do you drink alcohol or use any drugs?\" \"Have you started any new medicines recently?\"         Did drink 2 Adore teas last week prior to the anxiety attack- usually does not drink caffeine- patient states she is sensitive to it.  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 Does use medical marijuana- patient states this sometimes causes anxiety as well.    11. PATIENT SUPPORT: \"Who is with you now?\" \"Who do you live with?\" \"Do you have family or friends who you can talk to?\"           Patient lives with son, partner was with patient during her attack but felt like he couldn't help but did stay with her.  He does not live with patient.    12. OTHER SYMPTOMS: \"Do you have any other symptoms?\" (e.g., feeling depressed, trouble concentrating, trouble sleeping, trouble breathing, palpitations or fast heartbeat, chest pain, " "sweating, nausea, or diarrhea)          Patient has flashbacks of Saturday and tries not to think about it.  Patient gets really sad when she thinks about the day it happened.    13. PREGNANCY: \"Is there any chance you are pregnant?\" \"When was your last menstrual period?\"          Denies, February 14th was patients last period.    Protocols used: Common Cold-Adult-AH, Anxiety and Panic Attack-Adult-AH    "

## 2025-06-17 NOTE — PROGRESS NOTES
Virtual Regular Visit  Name: aSmia Bull      : 1990      MRN: [id number]  Encounter Provider: Farhan Koenig PA-C  Encounter Date: 2025   Encounter department: VIRTUAL CARE   :  Assessment & Plan  Intractable headache, unspecified chronicity pattern, unspecified headache type    Orders:    Transfer to other facility  Patient will proceed to the ER for further evaluation after having trauma to her head and now experiencing the worst headache of her life.  She declined ambulance.  Discussed with patient that should the headache worsen, she develop change in vision, she develop thoughts of self-harm, suicidal homicidal ideation patient should call [phone] immediately.If tests have been ordered our office will contact you with results if changes need to be made to the care plan discussed with you at the visit.  You can review your full results on [facility]  Follow up with PCP in 3-5 days.  Proceed to  ER if symptoms worsen.  If you need to contact care everywhere please call [phone]        History of Present Illness     This is a 35-year-old female with a past medical history of anxiety, PTSD, eating disorder and depression here complaining of headache since the weekend.  Patient notes she had a panic attack on Saturday night and perform self-harm by hitting herself repeatedly in the head.  Since then patient has had a headache and a sore throat which she feels is due to postnasal drip.  She states she has been sleeping with her head elevated.  Patient rates her headache an 8 and 10 and describes it as the worst headache of her life.  She states she has blurred vision with change of position. she notes she has sensitivity to light and screens.  She states the sensitivity is so severe that she had to shower in the dark this morning.  She notes that anxiety is elevated stating she is having flashbacks to Saturday night.  She states that her Atarax gives her nausea and drowsiness so she  is looking out for a another source of medication.  Patient does have a appointment this afternoon at 1 PM with her crisis supervisor.  Patient currently denies any thoughts of self-harm, suicidal homicidal ideation.  She denies nasal congestion, shortness of breath, chest pain.      Review of Systems   HENT:  Positive for postnasal drip and sore throat. Negative for congestion.    Eyes:  Positive for visual disturbance.   Respiratory:  Negative for shortness of breath.    Cardiovascular:  Negative for chest pain.   Neurological:  Positive for headaches.   Psychiatric/Behavioral:  Positive for self-injury. Negative for suicidal ideas. The patient is nervous/anxious.        Objective   Temp 97.9 °F (36.6 °C)     Physical Exam  Constitutional:       Appearance: Normal appearance.   Pulmonary:      Comments: Patient able to speak in multiple full sentences without needing to break or pause.     Skin:     Comments: No rashes noted on head or neck.       Neurological:      General: No focal deficit present.      Mental Status: She is alert and oriented to person, place, and time.     Psychiatric:      Comments: Patient is tearful throughout the entire visit.         Administrative Statements   Encounter provider Farhan Koenig PA-C    The Patient is located at Home and in the following state in which I hold an active license [place].    The patient was identified by name and date of birth. Samia Bull was informed that this is a telemedicine visit and that the visit is being conducted through the Epic Embedded platform. She agrees to proceed..  My office door was closed. No one else was in the room.  She acknowledged consent and understanding of privacy and security of the video platform. The patient has agreed to participate and understands they can discontinue the visit at any time.    I have spent a total time of 12 minutes in caring for this patient on the day of the visit/encounter including Risks and benefits of  tx options, Instructions for management, Patient and family education, Documenting in the medical record, Obtaining or reviewing history  , and Communicating with other healthcare professionals , not including the time spent for establishing the audio/video connection.

## 2025-06-19 ENCOUNTER — TELEPHONE (OUTPATIENT)
Age: 35
End: 2025-06-19

## 2025-06-19 ENCOUNTER — TELEMEDICINE (OUTPATIENT)
Dept: FAMILY MEDICINE CLINIC | Facility: CLINIC | Age: 35
End: 2025-06-19
Payer: COMMERCIAL

## 2025-06-19 DIAGNOSIS — F41.0 PANIC ANXIETY SYNDROME: Primary | ICD-10-CM

## 2025-06-19 PROCEDURE — 99214 OFFICE O/P EST MOD 30 MIN: CPT | Performed by: FAMILY MEDICINE

## 2025-06-19 RX ORDER — CITALOPRAM HYDROBROMIDE 10 MG/1
10 TABLET ORAL DAILY
Qty: 30 TABLET | Refills: 2 | Status: SHIPPED | OUTPATIENT
Start: 2025-06-19

## 2025-06-19 NOTE — TELEPHONE ENCOUNTER
Pt called in stating she was seen this morning at practice but did not discuss the following with Dr. Ram.     She states that was tested negative for her strep throat but losing voice and is red and irritated. Wants to know if she can be given with any medication or any other tests to check on.    Please do check and call back the patient to update on the same. Thanks

## 2025-06-19 NOTE — PROGRESS NOTES
Virtual Regular Visit  Name: Samia Bull      : 1990      MRN: [id number]  Encounter Provider: Loren Ram DO  Encounter Date: 2025   Encounter department: [facility]  :  Assessment & Plan  Panic anxiety syndrome  Symptoms appear secondary to panic anxiety syndrome.  She has had this problem recurrently for over the past 10 years.  She denies any SI or HI today.  She was seen in  and 15 and placed on treatment with antianxiety medications.  She previously was on benzodiazepine medications of clonazepam.  Due to her substance abuse history, we will avoid any benzodiazepine medications.  She was on previous treatment with citalopram.  At this time, we will restart citalopram however at a low dose of 10 mg.  Will increase to 20 mg in 1 month if symptoms are persisting.  She is advised to continue with her regular follow-up with her psychologist.  She is also advised on the importance of maintaining proper physical activity at home.  Orders:    citalopram (CeleXA) 10 mg tablet; Take 1 tablet (10 mg total) by mouth daily    Panic anxiety syndrome         I have spent a total time of 20 minutes in caring for this patient on the day of the visit/encounter including Risks and benefits of tx options, Instructions for management, and Patient and family education.        History of Present Illness     HPI  Patient is a 35-year-old female presents today with a CC of panic attacks.  She states that she has been having intermittent panic attacks for the past few weeks.  She states that this appears to be triggered by her masters program.  She states that she usually develops increased anxiety with her education.  She has been using hydroxyzine however this appears to cause persistent drowsiness for her.  She would like to consider starting medication to help with her anxiety.  Review of Systems   Constitutional:  Negative for activity change, chills, fatigue and fever.   HENT:   Negative for congestion, ear pain, sinus pressure and sore throat.    Eyes:  Negative for redness, itching and visual disturbance.   Respiratory:  Negative for cough and shortness of breath.    Cardiovascular:  Negative for chest pain and palpitations.   Gastrointestinal:  Negative for abdominal pain, diarrhea and nausea.   Endocrine: Negative for cold intolerance and heat intolerance.   Genitourinary:  Negative for dysuria, flank pain and frequency.   Musculoskeletal:  Negative for arthralgias, back pain, gait problem and myalgias.   Skin:  Negative for color change.   Allergic/Immunologic: Negative for environmental allergies.   Neurological:  Negative for dizziness, numbness and headaches.   Psychiatric/Behavioral:  Negative for behavioral problems and sleep disturbance. The patient is nervous/anxious.        Objective   There were no vitals taken for this visit.    Physical Exam  Constitutional:       General: She is not in acute distress.     Appearance: She is well-developed. She is not ill-appearing or toxic-appearing.   HENT:      Head: Normocephalic and atraumatic.      Right Ear: Hearing normal.      Left Ear: Hearing normal.      Nose: Nose normal.     Eyes:      General: Lids are normal.      Conjunctiva/sclera: Conjunctivae normal.     Pulmonary:      Effort: Pulmonary effort is normal. No respiratory distress.     Musculoskeletal:         General: Normal range of motion.      Cervical back: Normal range of motion.     Skin:     General: Skin is dry.      Findings: No erythema or rash.     Neurological:      Mental Status: She is alert and oriented to person, place, and time.      GCS: GCS eye subscore is 4. GCS verbal subscore is 5. GCS motor subscore is 6.     Psychiatric:         Behavior: Behavior normal.         Thought Content: Thought content normal.         Judgment: Judgment normal.         Administrative Statements   Encounter provider Loren Ram, DO    The Patient is located at Home and in  the following state in which I hold an active license [place].    The patient was identified by name and date of birth. Samia Bull was informed that this is a telemedicine visit and that the visit is being conducted through the Epic Embedded platform. She agrees to proceed..  My office door was closed. No one else was in the room.  She acknowledged consent and understanding of privacy and security of the video platform. The patient has agreed to participate and understands they can discontinue the visit at any time.    I have spent a total time of 20 minutes in caring for this patient on the day of the visit/encounter including Risks and benefits of tx options, Instructions for management, and Patient and family education, not including the time spent for establishing the audio/video connection.

## 2025-06-19 NOTE — TELEPHONE ENCOUNTER
Patient called saying that she is returning a missed called that she had gotten from the office and is asking if the office can give her a call back in the morning.

## 2025-06-20 NOTE — TELEPHONE ENCOUNTER
Spoke with patient. She has been taking Tylenol, throat lozenges and drinking tea. She will call if she needs an appt.

## 2025-06-28 DIAGNOSIS — Z30.42 ON DEPO-PROVERA FOR CONTRACEPTION: ICD-10-CM

## 2025-06-30 ENCOUNTER — TELEPHONE (OUTPATIENT)
Dept: OTHER | Facility: HOSPITAL | Age: 35
End: 2025-06-30

## 2025-06-30 RX ORDER — MEDROXYPROGESTERONE ACETATE 150 MG/ML
150 INJECTION, SUSPENSION INTRAMUSCULAR
Qty: 1 ML | Refills: 0 | Status: SHIPPED | OUTPATIENT
Start: 2025-06-30 | End: 2025-07-03 | Stop reason: SDUPTHER

## 2025-06-30 NOTE — TELEPHONE ENCOUNTER
Patient may call us back to have   medroxyPROGESTERone (DEPO-PROVERA) 150 mg/mL injection sent to her local pharmacy if it wasn't already shipped from [facility].

## 2025-07-03 ENCOUNTER — OFFICE VISIT (OUTPATIENT)
Dept: GYNECOLOGY | Facility: CLINIC | Age: 35
End: 2025-07-03
Payer: COMMERCIAL

## 2025-07-03 DIAGNOSIS — Z30.42 ON DEPO-PROVERA FOR CONTRACEPTION: ICD-10-CM

## 2025-07-03 DIAGNOSIS — Z30.42 ENCOUNTER FOR SURVEILLANCE OF INJECTABLE CONTRACEPTIVE: Primary | ICD-10-CM

## 2025-07-03 PROCEDURE — 96372 THER/PROPH/DIAG INJ SC/IM: CPT

## 2025-07-03 RX ORDER — MEDROXYPROGESTERONE ACETATE 150 MG/ML
150 INJECTION, SUSPENSION INTRAMUSCULAR
Qty: 1 ML | Refills: 0 | Status: SHIPPED | OUTPATIENT
Start: 2025-07-03

## 2025-07-03 RX ADMIN — MEDROXYPROGESTERONE ACETATE 150 MG: 150 INJECTION, SUSPENSION INTRAMUSCULAR at 16:11

## 2025-07-03 NOTE — PROGRESS NOTES
Patient presents today for her Depo. Given in the RD. Lot. [id number], exp. 08/2026. Pt provided her own medication. Patient had a few complaints of severe headaches, bloating and hair loss. Headaches and bloating she did not feel was related and is having them addressed. But hair has been thinning since being on the Depo. Suggested patient start taking a Hair, Skin and Nails supplement and would present this to Regi.

## 2025-07-15 ENCOUNTER — TELEPHONE (OUTPATIENT)
Age: 35
End: 2025-07-15

## 2025-07-26 ENCOUNTER — APPOINTMENT (OUTPATIENT)
Dept: LAB | Facility: CLINIC | Age: 35
End: 2025-07-26
Payer: COMMERCIAL

## 2025-07-26 ENCOUNTER — APPOINTMENT (OUTPATIENT)
Dept: LAB | Facility: CLINIC | Age: 35
End: 2025-07-26
Attending: PREVENTIVE MEDICINE
Payer: COMMERCIAL

## 2025-07-26 DIAGNOSIS — R12 HEARTBURN: ICD-10-CM

## 2025-07-26 DIAGNOSIS — Z00.8 HEALTH EXAMINATION IN POPULATION SURVEY: ICD-10-CM

## 2025-07-26 DIAGNOSIS — R13.10 DYSPHAGIA, UNSPECIFIED TYPE: ICD-10-CM

## 2025-07-26 LAB
CHOLEST SERPL-MCNC: 155 MG/DL (ref ?–200)
EST. AVERAGE GLUCOSE BLD GHB EST-MCNC: 111 MG/DL
HBA1C MFR BLD: 5.5 %
HDLC SERPL-MCNC: 72 MG/DL
IGA SERPL-MCNC: 172 MG/DL (ref 66–433)
LDLC SERPL CALC-MCNC: 73 MG/DL (ref 0–100)
NONHDLC SERPL-MCNC: 83 MG/DL
TRIGL SERPL-MCNC: 48 MG/DL (ref ?–150)

## 2025-07-26 PROCEDURE — 80061 LIPID PANEL: CPT

## 2025-07-26 PROCEDURE — 82784 ASSAY IGA/IGD/IGG/IGM EACH: CPT

## 2025-07-26 PROCEDURE — 36415 COLL VENOUS BLD VENIPUNCTURE: CPT

## 2025-07-26 PROCEDURE — 86364 TISS TRNSGLTMNASE EA IG CLAS: CPT

## 2025-07-26 PROCEDURE — 83036 HEMOGLOBIN GLYCOSYLATED A1C: CPT

## 2025-07-30 LAB — TTG IGA SER IA-ACNC: <0.4 U/ML (ref ?–10)

## (undated) DEVICE — INTENDED FOR TISSUE SEPARATION, AND OTHER PROCEDURES THAT REQUIRE A SHARP SURGICAL BLADE TO PUNCTURE OR CUT.: Brand: BARD-PARKER SAFETY BLADES SIZE 11, STERILE

## (undated) DEVICE — ENDOPATH ETS45 2.5MM RELOADS (VASCULAR/THIN): Brand: ENDOPATH

## (undated) DEVICE — SUT VICRYL 0 UR-6 27 IN J603H

## (undated) DEVICE — BLUE HEAT SCOPE WARMER

## (undated) DEVICE — [HIGH FLOW INSUFFLATOR,  DO NOT USE IF PACKAGE IS DAMAGED,  KEEP DRY,  KEEP AWAY FROM SUNLIGHT,  PROTECT FROM HEAT AND RADIOACTIVE SOURCES.]: Brand: PNEUMOSURE

## (undated) DEVICE — HARMONIC ACE +7 LAPAROSCOPIC SHEARS ADVANCED HEMOSTASIS 5MM DIAMETER 36CM SHAFT LENGTH  FOR USE WITH GRAY HAND PIECE ONLY: Brand: HARMONIC ACE

## (undated) DEVICE — EXOFIN PRECISION PEN HIGH VISCOSITY TOPICAL SKIN ADHESIVE: Brand: EXOFIN PRECISION PEN, 1G

## (undated) DEVICE — ENDOPATH XCEL UNIVERSAL TROCAR STABLILITY SLEEVES: Brand: ENDOPATH XCEL

## (undated) DEVICE — ENDOPATH ETS-FLEX45 ARTICULATING ENDOSCOPIC LINEAR CUTTER, NO RELOAD: Brand: ENDOPATH

## (undated) DEVICE — ENDOPATH PNEUMONEEDLE INSUFFLATION NEEDLES WITH LUER LOCK CONNECTORS 120MM: Brand: ENDOPATH

## (undated) DEVICE — CHLORAPREP HI-LITE 26ML ORANGE

## (undated) DEVICE — SCD SEQUENTIAL COMPRESSION COMFORT SLEEVE MEDIUM KNEE LENGTH: Brand: KENDALL SCD

## (undated) DEVICE — GLOVE SRG BIOGEL 6.5

## (undated) DEVICE — TISSUE RETRIEVAL SYSTEM: Brand: INZII RETRIEVAL SYSTEM

## (undated) DEVICE — PMI DISPOSABLE PUNCTURE CLOSURE DEVICE / SUTURE GRASPER: Brand: PMI

## (undated) DEVICE — TROCAR: Brand: KII FIOS FIRST ENTRY

## (undated) DEVICE — SUT MONOCRYL 4-0 PS-2 27 IN Y426H

## (undated) DEVICE — 2000CC GUARDIAN II: Brand: GUARDIAN

## (undated) DEVICE — PENCIL ELECTROSURG E-Z CLEAN -0035H

## (undated) DEVICE — GLOVE INDICATOR PI UNDERGLOVE SZ 6.5 BLUE

## (undated) DEVICE — ALLENTOWN LAP CHOLE APP PACK: Brand: CARDINAL HEALTH